# Patient Record
Sex: FEMALE | Race: BLACK OR AFRICAN AMERICAN | Employment: FULL TIME | URBAN - METROPOLITAN AREA
[De-identification: names, ages, dates, MRNs, and addresses within clinical notes are randomized per-mention and may not be internally consistent; named-entity substitution may affect disease eponyms.]

---

## 2024-01-02 NOTE — H&P (VIEW-ONLY)
Name: Danica Grove  YOB: 1985  Gender: female  MRN: [id number]    CC: [facility] (L)     HPI: Danica Grove is a 38 y.o. female who presents with shoulder pain (L). She is here today for a pre-op appointment.     No current outpatient medications on file.  No Known Allergies  No past medical history on file.  No past surgical history on file.  No family history on file.  Social History     Socioeconomic History    Marital status:      Spouse name: Not on file    Number of children: Not on file    Years of education: Not on file    Highest education level: Not on file   Occupational History    Not on file   Tobacco Use    Smoking status: Never    Smokeless tobacco: Never   Vaping Use    Vaping Use: Never used   Substance and Sexual Activity    Alcohol use: Not on file    Drug use: Not on file    Sexual activity: Not on file   Other Topics Concern    Not on file   Social History Narrative    Not on file     Social Determinants of Health     Financial Resource Strain: Not on file   Food Insecurity: Not on file   Transportation Needs: Not on file   Physical Activity: Not on file   Stress: Not on file   Social Connections: Not on file   Intimate Partner Violence: Not on file   Housing Stability: Not on file        Tobacco:  reports that she has never smoked. She has never used smokeless tobacco.  Diabetes: none  Other: none    Physical Examination:  General: no acute distress  Lungs: breathing easily  CV: regular rhythm by pulse  HEENT: Head is normocephalic and atraumatic without tenderness, visible or palpable masses, depressions or scarring.  Visual acuity intact.  Nasal mucosa is pink and moist. Pinna, tragus and ear canal are nontender and without swelling  ABD: soft and nontender to palpation. Normal bowel sound  Left Shoulder:  No previous surgical scars noted.  No gross deformity noted.  No bruising, rashes, wounds or sores noted.   Tenderness along the bicipital

## 2024-01-11 ENCOUNTER — ANESTHESIA EVENT (OUTPATIENT)
Dept: SURGERY | Age: 39
End: 2024-01-11
Payer: OTHER GOVERNMENT

## 2024-01-11 NOTE — PERIOP NOTE
Preop department called to notify patient of arrival time for scheduled procedure. Instructions given to   - Arrive at [facility] Entrance [address].  - Remain NPO after midnight, unless otherwise indicated, including gum, mints, and ice chips.   - Have a responsible adult to drive patient to the hospital, stay during surgery, and patient will need supervision 24 hours after anesthesia.   - Use antibacterial soap in shower the night before surgery and on the morning of surgery.       Was patient contacted: yes  Voicemail left: n/a  Numbers contacted: [phone]   Arrival time: 0815

## 2024-01-12 ENCOUNTER — HOSPITAL ENCOUNTER (OUTPATIENT)
Age: 39
Setting detail: OUTPATIENT SURGERY
Discharge: HOME | End: 2024-01-12
Attending: ORTHOPAEDIC SURGERY | Admitting: ORTHOPAEDIC SURGERY
Payer: OTHER GOVERNMENT

## 2024-01-12 ENCOUNTER — CLINICAL DOCUMENTATION (OUTPATIENT)
Dept: ORTHOPEDIC SURGERY | Age: 39
End: 2024-01-12

## 2024-01-12 ENCOUNTER — ANESTHESIA (OUTPATIENT)
Dept: SURGERY | Age: 39
End: 2024-01-12
Payer: OTHER GOVERNMENT

## 2024-01-12 VITALS
OXYGEN SATURATION: 96 % | WEIGHT: 185 LBS | HEART RATE: 57 BPM | RESPIRATION RATE: 18 BRPM | SYSTOLIC BLOOD PRESSURE: 109 MMHG | BODY MASS INDEX: 25.06 KG/M2 | TEMPERATURE: 97.2 F | HEIGHT: 72 IN | DIASTOLIC BLOOD PRESSURE: 63 MMHG

## 2024-01-12 DIAGNOSIS — M75.112 NONTRAUMATIC INCOMPLETE RUPTURE OF ROTATOR CUFF, LEFT: Primary | ICD-10-CM

## 2024-01-12 LAB — HCG UR QL: NEGATIVE

## 2024-01-12 PROCEDURE — 7100000001 HC PACU RECOVERY - ADDTL 15 MIN: Performed by: ORTHOPAEDIC SURGERY

## 2024-01-12 PROCEDURE — 3700000000 HC ANESTHESIA ATTENDED CARE: Performed by: ORTHOPAEDIC SURGERY

## 2024-01-12 PROCEDURE — 6360000002 HC RX W HCPCS: Performed by: ORTHOPAEDIC SURGERY

## 2024-01-12 PROCEDURE — 7100000000 HC PACU RECOVERY - FIRST 15 MIN: Performed by: ORTHOPAEDIC SURGERY

## 2024-01-12 PROCEDURE — 2500000003 HC RX 250 WO HCPCS: Performed by: STUDENT IN AN ORGANIZED HEALTH CARE EDUCATION/TRAINING PROGRAM

## 2024-01-12 PROCEDURE — 3600000004 HC SURGERY LEVEL 4 BASE: Performed by: ORTHOPAEDIC SURGERY

## 2024-01-12 PROCEDURE — 2709999900 HC NON-CHARGEABLE SUPPLY: Performed by: ORTHOPAEDIC SURGERY

## 2024-01-12 PROCEDURE — 7100000011 HC PHASE II RECOVERY - ADDTL 15 MIN: Performed by: ORTHOPAEDIC SURGERY

## 2024-01-12 PROCEDURE — 2580000003 HC RX 258: Performed by: STUDENT IN AN ORGANIZED HEALTH CARE EDUCATION/TRAINING PROGRAM

## 2024-01-12 PROCEDURE — 6360000002 HC RX W HCPCS: Performed by: STUDENT IN AN ORGANIZED HEALTH CARE EDUCATION/TRAINING PROGRAM

## 2024-01-12 PROCEDURE — 7100000010 HC PHASE II RECOVERY - FIRST 15 MIN: Performed by: ORTHOPAEDIC SURGERY

## 2024-01-12 PROCEDURE — 2500000003 HC RX 250 WO HCPCS: Performed by: NURSE ANESTHETIST, CERTIFIED REGISTERED

## 2024-01-12 PROCEDURE — 81025 URINE PREGNANCY TEST: CPT

## 2024-01-12 PROCEDURE — 2580000003 HC RX 258: Performed by: NURSE ANESTHETIST, CERTIFIED REGISTERED

## 2024-01-12 PROCEDURE — C1763 CONN TISS, NON-HUMAN: HCPCS | Performed by: ORTHOPAEDIC SURGERY

## 2024-01-12 PROCEDURE — 2720000010 HC SURG SUPPLY STERILE: Performed by: ORTHOPAEDIC SURGERY

## 2024-01-12 PROCEDURE — 6360000002 HC RX W HCPCS: Performed by: NURSE ANESTHETIST, CERTIFIED REGISTERED

## 2024-01-12 PROCEDURE — 64415 NJX AA&/STRD BRCH PLXS IMG: CPT | Performed by: STUDENT IN AN ORGANIZED HEALTH CARE EDUCATION/TRAINING PROGRAM

## 2024-01-12 PROCEDURE — C1713 ANCHOR/SCREW BN/BN,TIS/BN: HCPCS | Performed by: ORTHOPAEDIC SURGERY

## 2024-01-12 PROCEDURE — 3600000014 HC SURGERY LEVEL 4 ADDTL 15MIN: Performed by: ORTHOPAEDIC SURGERY

## 2024-01-12 PROCEDURE — 6360000002 HC RX W HCPCS: Performed by: PHYSICIAN ASSISTANT

## 2024-01-12 PROCEDURE — 3700000001 HC ADD 15 MINUTES (ANESTHESIA): Performed by: ORTHOPAEDIC SURGERY

## 2024-01-12 DEVICE — IMPLANTABLE DEVICE
Type: IMPLANTABLE DEVICE | Site: SHOULDER | Status: FUNCTIONAL
Brand: BIOINDUCTIVE IMPLANT WITH ARTHROSCOPIC DELIVERY SYSTEM - MEDIUM

## 2024-01-12 DEVICE — ANCHOR TEND 8 FOR REGENETEN BIOINDUCTIVE IMPL SYS: Type: IMPLANTABLE DEVICE | Site: SHOULDER | Status: FUNCTIONAL

## 2024-01-12 DEVICE — IMPLANT SYSTEM, FIBERTAK BUTTON
Type: IMPLANTABLE DEVICE | Site: SHOULDER | Status: FUNCTIONAL
Brand: ARTHREX®

## 2024-01-12 DEVICE — BONE ANCHORS 3 WITH ARTHROSCOPIC DELIVERY SYSTEM ADVANCED
Type: IMPLANTABLE DEVICE | Site: SHOULDER | Status: FUNCTIONAL
Brand: BONE ANCHORS WITH ARTHROSCOPIC DELIVERY SYSTEM - ADVANCED

## 2024-01-12 RX ORDER — SODIUM CHLORIDE 9 MG/ML
INJECTION, SOLUTION INTRAVENOUS PRN
Status: DISCONTINUED | OUTPATIENT
Start: 2024-01-12 | End: 2024-01-12 | Stop reason: HOSPADM

## 2024-01-12 RX ORDER — MIDAZOLAM HYDROCHLORIDE 2 MG/2ML
2 INJECTION, SOLUTION INTRAMUSCULAR; INTRAVENOUS
Status: COMPLETED | OUTPATIENT
Start: 2024-01-12 | End: 2024-01-12

## 2024-01-12 RX ORDER — HYDROMORPHONE HYDROCHLORIDE 2 MG/ML
0.5 INJECTION, SOLUTION INTRAMUSCULAR; INTRAVENOUS; SUBCUTANEOUS EVERY 5 MIN PRN
Status: DISCONTINUED | OUTPATIENT
Start: 2024-01-12 | End: 2024-01-12 | Stop reason: HOSPADM

## 2024-01-12 RX ORDER — PROPOFOL 10 MG/ML
INJECTION, EMULSION INTRAVENOUS PRN
Status: DISCONTINUED | OUTPATIENT
Start: 2024-01-12 | End: 2024-01-12 | Stop reason: SDUPTHER

## 2024-01-12 RX ORDER — DEXAMETHASONE SODIUM PHOSPHATE 4 MG/ML
INJECTION, SOLUTION INTRA-ARTICULAR; INTRALESIONAL; INTRAMUSCULAR; INTRAVENOUS; SOFT TISSUE PRN
Status: DISCONTINUED | OUTPATIENT
Start: 2024-01-12 | End: 2024-01-12 | Stop reason: SDUPTHER

## 2024-01-12 RX ORDER — SODIUM CHLORIDE 0.9 % (FLUSH) 0.9 %
5-40 SYRINGE (ML) INJECTION EVERY 12 HOURS SCHEDULED
Status: DISCONTINUED | OUTPATIENT
Start: 2024-01-12 | End: 2024-01-12 | Stop reason: HOSPADM

## 2024-01-12 RX ORDER — PROCHLORPERAZINE EDISYLATE 5 MG/ML
5 INJECTION INTRAMUSCULAR; INTRAVENOUS
Status: COMPLETED | OUTPATIENT
Start: 2024-01-12 | End: 2024-01-12

## 2024-01-12 RX ORDER — LIDOCAINE HYDROCHLORIDE 20 MG/ML
INJECTION, SOLUTION EPIDURAL; INFILTRATION; INTRACAUDAL; PERINEURAL PRN
Status: DISCONTINUED | OUTPATIENT
Start: 2024-01-12 | End: 2024-01-12 | Stop reason: SDUPTHER

## 2024-01-12 RX ORDER — OXYCODONE HYDROCHLORIDE 5 MG/1
5 TABLET ORAL EVERY 6 HOURS PRN
Qty: 30 TABLET | Refills: 0 | Status: SHIPPED | OUTPATIENT
Start: 2024-01-12 | End: 2024-01-19

## 2024-01-12 RX ORDER — ONDANSETRON 2 MG/ML
INJECTION INTRAMUSCULAR; INTRAVENOUS PRN
Status: DISCONTINUED | OUTPATIENT
Start: 2024-01-12 | End: 2024-01-12 | Stop reason: SDUPTHER

## 2024-01-12 RX ORDER — TRANEXAMIC ACID 100 MG/ML
INJECTION, SOLUTION INTRAVENOUS PRN
Status: DISCONTINUED | OUTPATIENT
Start: 2024-01-12 | End: 2024-01-12 | Stop reason: SDUPTHER

## 2024-01-12 RX ORDER — GLYCOPYRROLATE 0.2 MG/ML
INJECTION INTRAMUSCULAR; INTRAVENOUS PRN
Status: DISCONTINUED | OUTPATIENT
Start: 2024-01-12 | End: 2024-01-12 | Stop reason: SDUPTHER

## 2024-01-12 RX ORDER — NEOSTIGMINE METHYLSULFATE 1 MG/ML
INJECTION, SOLUTION INTRAVENOUS PRN
Status: DISCONTINUED | OUTPATIENT
Start: 2024-01-12 | End: 2024-01-12 | Stop reason: SDUPTHER

## 2024-01-12 RX ORDER — LIDOCAINE HYDROCHLORIDE 10 MG/ML
1 INJECTION, SOLUTION INFILTRATION; PERINEURAL
Status: DISCONTINUED | OUTPATIENT
Start: 2024-01-12 | End: 2024-01-12 | Stop reason: HOSPADM

## 2024-01-12 RX ORDER — BUPIVACAINE HYDROCHLORIDE AND EPINEPHRINE 5; 5 MG/ML; UG/ML
INJECTION, SOLUTION EPIDURAL; INTRACAUDAL; PERINEURAL
Status: COMPLETED | OUTPATIENT
Start: 2024-01-12 | End: 2024-01-12

## 2024-01-12 RX ORDER — SODIUM CHLORIDE 0.9 % (FLUSH) 0.9 %
5-40 SYRINGE (ML) INJECTION PRN
Status: DISCONTINUED | OUTPATIENT
Start: 2024-01-12 | End: 2024-01-12 | Stop reason: HOSPADM

## 2024-01-12 RX ORDER — SODIUM CHLORIDE, SODIUM LACTATE, POTASSIUM CHLORIDE, CALCIUM CHLORIDE 600; 310; 30; 20 MG/100ML; MG/100ML; MG/100ML; MG/100ML
INJECTION, SOLUTION INTRAVENOUS CONTINUOUS
Status: DISCONTINUED | OUTPATIENT
Start: 2024-01-12 | End: 2024-01-12 | Stop reason: HOSPADM

## 2024-01-12 RX ORDER — ONDANSETRON 2 MG/ML
4 INJECTION INTRAMUSCULAR; INTRAVENOUS
Status: DISCONTINUED | OUTPATIENT
Start: 2024-01-12 | End: 2024-01-12 | Stop reason: HOSPADM

## 2024-01-12 RX ORDER — FENTANYL CITRATE 50 UG/ML
100 INJECTION, SOLUTION INTRAMUSCULAR; INTRAVENOUS
Status: COMPLETED | OUTPATIENT
Start: 2024-01-12 | End: 2024-01-12

## 2024-01-12 RX ORDER — OXYCODONE HYDROCHLORIDE 5 MG/1
5 TABLET ORAL
Status: DISCONTINUED | OUTPATIENT
Start: 2024-01-12 | End: 2024-01-12 | Stop reason: HOSPADM

## 2024-01-12 RX ORDER — ROCURONIUM BROMIDE 10 MG/ML
INJECTION, SOLUTION INTRAVENOUS PRN
Status: DISCONTINUED | OUTPATIENT
Start: 2024-01-12 | End: 2024-01-12 | Stop reason: SDUPTHER

## 2024-01-12 RX ORDER — DEXAMETHASONE SODIUM PHOSPHATE 4 MG/ML
INJECTION, SOLUTION INTRA-ARTICULAR; INTRALESIONAL; INTRAMUSCULAR; INTRAVENOUS; SOFT TISSUE
Status: COMPLETED | OUTPATIENT
Start: 2024-01-12 | End: 2024-01-12

## 2024-01-12 RX ORDER — EPINEPHRINE 1 MG/ML(1)
AMPUL (ML) INJECTION PRN
Status: DISCONTINUED | OUTPATIENT
Start: 2024-01-12 | End: 2024-01-12 | Stop reason: ALTCHOICE

## 2024-01-12 RX ORDER — EPHEDRINE SULFATE/0.9% NACL/PF 50 MG/5 ML
SYRINGE (ML) INTRAVENOUS PRN
Status: DISCONTINUED | OUTPATIENT
Start: 2024-01-12 | End: 2024-01-12 | Stop reason: SDUPTHER

## 2024-01-12 RX ORDER — SODIUM CHLORIDE 9 MG/ML
INJECTION, SOLUTION INTRAVENOUS CONTINUOUS
Status: DISCONTINUED | OUTPATIENT
Start: 2024-01-12 | End: 2024-01-12 | Stop reason: HOSPADM

## 2024-01-12 RX ADMIN — MIDAZOLAM 2 MG: 1 INJECTION INTRAMUSCULAR; INTRAVENOUS at 10:18

## 2024-01-12 RX ADMIN — TRANEXAMIC ACID 1000 MG: 100 INJECTION, SOLUTION INTRAVENOUS at 11:39

## 2024-01-12 RX ADMIN — BUPIVACAINE HYDROCHLORIDE AND EPINEPHRINE BITARTRATE 15 ML: 5; .005 INJECTION, SOLUTION EPIDURAL; INTRACAUDAL; PERINEURAL at 10:18

## 2024-01-12 RX ADMIN — PHENYLEPHRINE HYDROCHLORIDE 100 MCG: 10 INJECTION INTRAVENOUS at 11:51

## 2024-01-12 RX ADMIN — PROPOFOL 200 MG: 10 INJECTION, EMULSION INTRAVENOUS at 11:29

## 2024-01-12 RX ADMIN — Medication 2 G: at 11:39

## 2024-01-12 RX ADMIN — DEXAMETHASONE SODIUM PHOSPHATE 4 MG: 4 INJECTION, SOLUTION INTRAMUSCULAR; INTRAVENOUS at 10:18

## 2024-01-12 RX ADMIN — LIDOCAINE HYDROCHLORIDE 80 MG: 20 INJECTION, SOLUTION EPIDURAL; INFILTRATION; INTRACAUDAL; PERINEURAL at 11:29

## 2024-01-12 RX ADMIN — FENTANYL CITRATE 100 MCG: 50 INJECTION INTRAMUSCULAR; INTRAVENOUS at 11:29

## 2024-01-12 RX ADMIN — ONDANSETRON 4 MG: 2 INJECTION INTRAMUSCULAR; INTRAVENOUS at 11:39

## 2024-01-12 RX ADMIN — ROCURONIUM BROMIDE 50 MG: 50 INJECTION, SOLUTION INTRAVENOUS at 11:29

## 2024-01-12 RX ADMIN — PHENYLEPHRINE HYDROCHLORIDE 100 MCG: 10 INJECTION INTRAVENOUS at 12:00

## 2024-01-12 RX ADMIN — GLYCOPYRROLATE 0.4 MG: 0.2 INJECTION INTRAMUSCULAR; INTRAVENOUS at 12:46

## 2024-01-12 RX ADMIN — DEXAMETHASONE SODIUM PHOSPHATE 4 MG: 4 INJECTION, SOLUTION INTRAMUSCULAR; INTRAVENOUS at 11:39

## 2024-01-12 RX ADMIN — SODIUM CHLORIDE, POTASSIUM CHLORIDE, SODIUM LACTATE AND CALCIUM CHLORIDE: 600; 310; 30; 20 INJECTION, SOLUTION INTRAVENOUS at 09:08

## 2024-01-12 RX ADMIN — Medication 10 MG: at 12:03

## 2024-01-12 RX ADMIN — Medication 3 MG: at 12:46

## 2024-01-12 RX ADMIN — PROCHLORPERAZINE EDISYLATE 5 MG: 5 INJECTION INTRAMUSCULAR; INTRAVENOUS at 14:00

## 2024-01-12 RX ADMIN — PHENYLEPHRINE HYDROCHLORIDE 100 MCG: 10 INJECTION INTRAVENOUS at 11:43

## 2024-01-12 RX ADMIN — FENTANYL CITRATE 50 MCG: 50 INJECTION, SOLUTION INTRAMUSCULAR; INTRAVENOUS at 10:18

## 2024-01-12 ASSESSMENT — PAIN SCALES - GENERAL: PAINLEVEL_OUTOF10: 0

## 2024-01-12 NOTE — ANESTHESIA POSTPROCEDURE EVALUATION
Department of Anesthesiology  Postprocedure Note    Patient: Danica Badillo  MRN: [id number]  YOB: 1985  Date of evaluation: 1/12/2024    Procedure Summary     Date: 01/12/24 Room / Location: [facility] OP OR 06 / [facility]    Anesthesia Start: 1119 Anesthesia Stop: 1335    Procedures:       left shoulder arthroscopy with subacromial decompression and  rotator cuff repair with allograft augmentation (Left: Shoulder)      LEFT SHOULDER OPEN BICEPS TENODESIS (Left: Shoulder) Diagnosis:       Superior glenoid labrum lesion of left shoulder, initial encounter      Impingement syndrome of left shoulder      Nontraumatic incomplete rupture of rotator cuff, left      (Superior glenoid labrum lesion of left shoulder, initial encounter [S43.432A])      (Impingement syndrome of left shoulder [M75.42])      (Nontraumatic incomplete rupture of rotator cuff, left [M75.112])    Surgeons: Jani Abrams MD Responsible Provider: Patrick Boyce MD    Anesthesia Type: General ASA Status: 2          Anesthesia Type: General    Germaine Phase I: Germaine Score: 7    Germaine Phase II: Germaine Score: 9    Anesthesia Post Evaluation    Patient location during evaluation: PACU  Patient participation: complete - patient participated  Post-procedure mental status: Sleepy but responds to questions.  Airway patency: patent  Nausea & Vomiting: nausea and no vomiting  Cardiovascular status: acceptable.  Respiratory status: acceptable  Hydration status: stable  Pain management: adequate        No notable events documented.

## 2024-01-12 NOTE — DISCHARGE INSTRUCTIONS
Post-op instructions for Shoulder Rotator Cuff Repair / Labral Repair / Stabilization   Day of Surgery:     DIET:   Begin with liquids and light foods (jell-o, soup, etc.). Progress to your normal diet if you are not nauseated.    MEDICATION:   1. Pain- Norco (hydrocodone/acetaminophen) or Oxycodone. If you are taking oxycodone, you may also take two (2) Tylenol (acetaminophen) 500mg tabs every eight (8) hours. Do not take Tylenol (acetaminophen) if you are given Norco as this medicine already contains acetaminophen. Do not drink alcohol while taking pain medication. Slowly wean off the pain medication as the pain improves.   2. Stool Softener-Pain medication can cause constipation. Be sure to drink plenty of water and take an over the counter stool softener as needed.     ICE:  Do NOT put the ice machine directly on your skin. Use it frequently for the first 72 hours. You may also use a regular ice bag/pack for 20 minutes at a time. Place a thin layer of clothing or pillow case between ice pack and your skin. Ice for 20-30 minutes on and then 20-30 minutes off.        Did  you  receive  a  nerve  Block?  A  nerve  block  can  provide  pain  relief  for  one  hour  to  two  days  after  your  surgery.  As  long  as  the  nerve  block  is  working,  you  will  experience  little  or  no  sensation  in  the  area  the  surgeon  operated  on.        As  the  nerve  block  wears  off,  you  will  begin  to  experience  pain  or  discomfort.  It  is  very  important  that  you  begin  taking  your  prescribed  pain  medication  before  the  nerve  block  fully  wears  off. The first sign that the nerve block is wearing off is tingling in your fingers. Treating  your  pain  at  the  first  sign  of  the  block  wearing  off  will  ensure  your  pain  is  better  controlled  and  more  tolerable  when  full-sensation  returns.  Do  not  wait  until  the  pain  is  intolerable,  as  the  medicine  will  be  less  effective.

## 2024-01-12 NOTE — OP NOTE
Operative Report    Patient: Danica Badillo MRN: [id number]  SSN: [id number]    YOB: 1985  Age: 38 y.o.  Sex: female       Date of Surgery: 1/12/2024     Preoperative Diagnosis:   1)left  Shoulder Rotator cuff tear  2)left  Shoulder outlet impingement  3) left  Shoulder biceps tendinitis    Postoperative Diagnosis: Same as above     Surgeon(s) and Role:     * Jani Abrams MD - Primary    Assistant: Coty Santoyo PA-C   The complexity of the surgery requires the use of a first assistant for patient positioning, suture management, anchor placement, and tensioning of repair and assistance in closure.  DAPHNE Eduardo was this assistant and was there for the entirety of the case.     Anesthesia: General + ISB block    Procedure: 1) left  Shoulder Arthroscopic Rotator Cuff Repair with Regeneten patch  2)  left shoulder open subpectoral biceps tenodesis  3) left  Shoulder Arthroscopy with subacromial decompression  4) left shoulder arthroscopy with debridement including labrum and biceps tendon      Estimated Blood Loss:  20 mL      Implants: Arthrex proximal biceps button  Regeneten patch with tendon staples and 1 bone staple           Specimens: * No specimens in log *        Drains: None                Complications: None    Counts: Sponge and needle counts were correct times two.    Procedure in Detail: After informed consent was obtained the surgical site was marked in the preoperative area by myself and the patient was brought to the operating room and general anesthesia was induced.  The patient was positioned in the beachchair position with all bony prominences well-padded and the operative shoulder was prepped and draped in the usual orthopedic sterile fashion.  Timeout was performed per protocol antibiotics given per protocol.     Initially a standard posterior lateral arthroscopy viewing portal was established.  Diagnostic arthroscopy was carried out.  Anterior  button in this unicortically.  The tendon was then tensioned down in a tension slide technique for an onlay tenodesis technique.  We took a free needle and passed back through the tendon with a fiber loop suture and tied this down as a knot.  I was pleased with the quality of fixation and the tensioning.  This wound was irrigated copiously and then closed in layers with buried 2-0 Monocryl suture. The skin closed with running 3-0 subcuticular Monocryl and Dermabond.     Shoulder was then drained portal sites were closed with buried Monocryl suture and Steri-Strips sterile dressing cryotherapy device and sling and abduction pillow were applied.  Danica tolerated the procedure well was awakened and transferred to the PACU in stable condition    Discharge plan:  Disposition: Home  Rehab protocol: Small rotator cuff repair and biceps tenodesis protocol.  Sling immobilization 4 weeks        Signed By:  Jani Abrams MD     January 12, 2024

## 2024-01-12 NOTE — ANESTHESIA PRE PROCEDURE
AGRATIO 1.1 07/30/2021 09:29 PM    GLUCOSE 91 07/30/2021 09:29 PM    PROT 7.3 07/30/2021 09:29 PM    CALCIUM 8.6 07/30/2021 09:29 PM    BILITOT 0.4 07/30/2021 09:29 PM    ALKPHOS 38 07/30/2021 09:29 PM    AST 10 07/30/2021 09:29 PM    ALT 17 07/30/2021 09:29 PM       POC Tests: No results for input(s): \"POCGLU\", \"POCNA\", \"POCK\", \"POCCL\", \"POCBUN\", \"POCHEMO\", \"POCHCT\" in the last 72 hours.    Coags: No results found for: \"PROTIME\", \"INR\", \"APTT\"    HCG (If Applicable):   Lab Results   Component Value Date    PREGTESTUR Negative 01/12/2024        ABGs: No results found for: \"PHART\", \"PO2ART\", \"KAZ0SFK\", \"MUJ5HIH\", \"BEART\", \"M1GVFYNF\"     Type & Screen (If Applicable):  No results found for: \"LABABO\", \"LABRH\"    Drug/Infectious Status (If Applicable):  No results found for: \"HIV\", \"HEPCAB\"    COVID-19 Screening (If Applicable):   Lab Results   Component Value Date/Time    COVID19 Detected 07/30/2021 09:29 PM           Anesthesia Evaluation  Patient summary reviewed and Nursing notes reviewed   no history of anesthetic complications:   Airway: Mallampati: II  TM distance: >3 FB   Neck ROM: full  Mouth opening: > = 3 FB   Dental: normal exam         Pulmonary:normal exam  breath sounds clear to auscultation                             Cardiovascular:  Exercise tolerance: good (>4 METS)          Rhythm: regular  Rate: normal                    Neuro/Psych:   Negative Neuro/Psych ROS  (+) depression/anxiety             GI/Hepatic/Renal: Neg GI/Hepatic/Renal ROS            Endo/Other: Negative Endo/Other ROS   (+) hypothyroidism::..                 Abdominal:             Vascular:          Other Findings:             Anesthesia Plan      general     ASA 2       Induction: intravenous.    MIPS: Postoperative opioids intended.  Anesthetic plan and risks discussed with patient.      Plan discussed with CRNA.          Post-op pain plan if not by surgeon: single peripheral nerve block            aPtrick Boyce MD

## 2024-01-12 NOTE — ANESTHESIA PROCEDURE NOTES
Peripheral Block    Patient location during procedure: pre-op  Reason for block: post-op pain management and at surgeon's request  Start time: 1/12/2024 10:18 AM  End time: 1/12/2024 10:25 AM  Staffing  Performed: anesthesiologist   Anesthesiologist: Patrick Boyce MD  Performed by: Patrick Boyce MD  Authorized by: Patrick Boyce MD    Preanesthetic Checklist  Completed: patient identified, site marked, risks and benefits discussed, equipment checked, pre-op evaluation, timeout performed, anesthesia consent given, oxygen available and monitors applied/VS acknowledged  Peripheral Block   Patient position: sitting  Prep: ChloraPrep  Provider prep: mask and sterile gloves  Patient monitoring: cardiac monitor, continuous pulse ox, oxygen, IV access, frequent blood pressure checks and responsive to questions  Block type: Brachial plexus  Interscalene  Laterality: left  Injection technique: single-shot  Guidance: ultrasound guided  Local infiltration: lidocaine  Infiltration strength: 1 %  Local infiltration: lidocaine  Dose: 3 mL    Needle   Needle type: insulated echogenic nerve stimulator needle   Needle gauge: 20 G  Needle localization: ultrasound guidance (minimal motor response at >0.4 mA)  Needle length: 10 cm  Assessment   Injection assessment: negative aspiration for heme, no paresthesia on injection, local visualized surrounding nerve on ultrasound and no intravascular symptoms  Paresthesia pain: none  Slow fractionated injection: yes  Hemodynamics: stable  Real-time US image taken/store: yes  Outcomes: patient tolerated procedure well and uncomplicated    Additional Notes  Risks/benefits/alternatives discussed including damage to nerve or muscle.  3 cc 1% lidocaine local injected at needle insertion site.  Needle inserted and placed in close proximity to the nerve under real time ultrasound guidance.  Ultrasound was used to visualize the spread of local anesthetic in close proximity to the nerve being

## 2024-01-12 NOTE — INTERVAL H&P NOTE
Update History & Physical    The Patient's History and Physical was reviewed   I discussed the surgery and patients medical condition with the patient.  The chart was reviewed with the patient and I examined the patient.    There was no change from previous note.  The surgical site was confirmed by the patient and me.    CV: RRR  RESP: CTAB    Plan:  The risk, benefits, expected outcome, and alternative to the recommended procedure have been discussed with the patient.  Patient understands and elects to proceed with the procedure as planned.    Electronically signed by Jani Abrams MD on 01/12/24 9:58 AM

## 2024-01-15 DIAGNOSIS — M75.112 NONTRAUMATIC INCOMPLETE TEAR OF LEFT ROTATOR CUFF: Primary | ICD-10-CM

## 2024-01-16 ENCOUNTER — HOSPITAL ENCOUNTER (OUTPATIENT)
Dept: PHYSICAL THERAPY | Age: 39
Setting detail: RECURRING SERIES
Discharge: HOME | End: 2024-01-19
Payer: OTHER GOVERNMENT

## 2024-01-16 DIAGNOSIS — Z98.890 S/P LEFT ROTATOR CUFF REPAIR: ICD-10-CM

## 2024-01-16 DIAGNOSIS — M25.512 LEFT SHOULDER PAIN, UNSPECIFIED CHRONICITY: Primary | ICD-10-CM

## 2024-01-16 DIAGNOSIS — R29.898 SHOULDER WEAKNESS: ICD-10-CM

## 2024-01-16 PROCEDURE — 97110 THERAPEUTIC EXERCISES: CPT

## 2024-01-16 PROCEDURE — 97161 PT EVAL LOW COMPLEX 20 MIN: CPT

## 2024-01-16 NOTE — PROGRESS NOTES
Danica Badillo  : 1985  Primary: Vaccn [employer] (Other)  Secondary:  [facility] [place]  [address]  [facility] [address]  Phone: [phone]  Fax: [phone] Plan Frequency: 1-2x/wk  Plan of Care/Certification Expiration Date: 04/15/24        Plan of Care/Certification Expiration Date:  Plan of Care/Certification Expiration Date: 04/15/24    Frequency/Duration: Plan Frequency: 1-2x/wk      Time In/Out:   Time In: 1635  Time Out: 1735      PT Visit Info:    Plan Frequency: 1-2x/wk      Visit Count:  1    OUTPATIENT PHYSICAL THERAPY:   Treatment Note 2024       Episode  (s/p L shoulder scope)               Treatment Diagnosis:    Left shoulder pain, unspecified chronicity  Shoulder weakness  S/P left rotator cuff repair  Medical/Referring Diagnosis:    Nontraumatic incomplete tear of left rotator cuff [M75.112]    Referring Physician:  Jani Abrams MD MD Orders:  PT Eval and Treat   Return MD Appt:  24   Date of Onset:  Onset Date: 24 (DOS)     Allergies:   Patient has no known allergies.  Restrictions/Precautions:   Small rotator cuff repair and biceps tenodesis protocol      Interventions Planned (Treatment may consist of any combination of the following):  Current Treatment Recommendations: Strengthening; ROM; Manual; Safety education & training; Dry needling; Modalities; Home exercise program; Return to work related activity; Pain management    See Assessment Note    Subjective Comments:   Reports that she has had moderate pain since surgery. Has had a hard time sleeping.   Initial Pain Level::     7/10  Post Session Pain Level:       5/10  Medications Last Reviewed:  2024  Updated Objective Findings:  See Evaluation Note from today  Treatment   THERAPEUTIC EXERCISE: (15 minutes):    Exercises per grid below to improve mobility and strength.     Date:  24   Activity/Exercise Parameters   Education Discussed HEP,

## 2024-01-16 NOTE — PLAN OF CARE
Danica Badillo  : 1985  Primary: Vaccn [employer] (Other)  Secondary:  [facility] [place]  [address]  [facility] [address]  Phone: [phone]  Fax: [phone] Plan Frequency: 1-2x/wk  Plan of Care/Certification Expiration Date: 04/15/24        Plan of Care/Certification Expiration Date:  Plan of Care/Certification Expiration Date: 04/15/24    Frequency/Duration: Plan Frequency: 1-2x/wk      Time In/Out:   Time In: 1635  Time Out: 1735      PT Visit Info:    Plan Frequency: 1-2x/wk      Visit Count:  1                OUTPATIENT PHYSICAL THERAPY:             Initial Assessment 2024               Episode (s/p L shoulder scope)         Treatment Diagnosis:    Left shoulder pain, unspecified chronicity  Shoulder weakness  S/P left rotator cuff repair  Medical/Referring Diagnosis:    Nontraumatic incomplete tear of left rotator cuff [M75.112]    Referring Physician:  Jani Abrams MD MD Orders:  PT Eval and Treat   Return MD Appt:  24  Date of Onset:  Onset Date: 24 (DOS)     Allergies:  Patient has no known allergies.  Restrictions/Precautions:    Follow small rotator cuff and biceps tenodesis protocol      Medications Last Reviewed:  2024     SUBJECTIVE   History of Injury/Illness (Reason for Referral):  Reports that she had shoulder trouble for a long time. She went to physical therapy for about a year in . She had an mri and decided to have surgery on 24. She has had a hard time sleeping since then. She has been taking oxycodone 5mg for pain. She is right handed.  She enjoys going to the gym and running long distance. She is still active duty in the .   Patient Stated Goal(s):  \"to return to full activity\"  Initial Pain Level:      7/10   Post Session Pain Level:     5/10  Past Medical History/Comorbidities:   Ms. Perfecto Badillo  has a past medical history of Hypothyroidism and PTSD (post-traumatic

## 2024-01-21 ASSESSMENT — PAIN SCALES - GENERAL: PAINLEVEL_OUTOF10: 7

## 2024-01-22 ENCOUNTER — TELEPHONE (OUTPATIENT)
Dept: ORTHOPEDIC SURGERY | Age: 39
End: 2024-01-22

## 2024-01-22 NOTE — TELEPHONE ENCOUNTER
Spoke with patient and discussed biceps incision. States she does not feel sick. Incision is closed, she denies redness and pain. Explained there is glue over the top of the incision, which may cause it to look abnormal. Told her as long as she does not feel ill, she can wait till her follow up appointment with Jon AVIAL tomorrow to assess the incision

## 2024-01-22 NOTE — PROGRESS NOTES
Name: Danica Badillo  YOB: 1985  Gender: female  MRN: [id number]    Procedure Performed:   1) left  Shoulder Arthroscopic Rotator Cuff Repair with Regeneten patch  2) left shoulder open subpectoral biceps tenodesis  3) left  Shoulder Arthroscopy with subacromial decompression  4) left shoulder arthroscopy with debridement including labrum and biceps tendon    Date of Procedure: 1/12/24    Subjective: Returns 2 weeks status post the above procedure. She is not having much pain at this time. Her only complaint is having trouble sleeping at night. She is working with Raul at [facility].     Physical Examination: All portal Incisions clean dry and intact, no sign of infection. He bicep tendonesis incision has some drainage. Motor and sensory intact. Distal radial pulse 2+.     Assessment:   1. Status post orthopedic surgery, follow-up exam       Plan:   Doing well.   Continue PT per Small rotator cuff repair and biceps tenodesis protocol.  Sling immobilization 4 weeks  Follow up in 2 weeks for wound check with me.

## 2024-01-22 NOTE — TELEPHONE ENCOUNTER
Pt called she has some questions about her scars she states that it dosent look good looks infected

## 2024-01-23 ENCOUNTER — HOSPITAL ENCOUNTER (OUTPATIENT)
Dept: PHYSICAL THERAPY | Age: 39
Setting detail: RECURRING SERIES
Discharge: HOME | End: 2024-01-26
Payer: OTHER GOVERNMENT

## 2024-01-23 PROCEDURE — 97110 THERAPEUTIC EXERCISES: CPT

## 2024-01-23 PROCEDURE — 97140 MANUAL THERAPY 1/> REGIONS: CPT

## 2024-01-23 ASSESSMENT — PAIN SCALES - GENERAL: PAINLEVEL_OUTOF10: 7

## 2024-01-23 NOTE — PROGRESS NOTES
Danica Badillo  : 1985  Primary: Vaccn [employer] (Other)  Secondary:  [facility] [place]  [address]  [facility] [address]  Phone: [phone]  Fax: [phone] Plan Frequency: 1-2x/wk  Plan of Care/Certification Expiration Date: 04/15/24        Plan of Care/Certification Expiration Date:  Plan of Care/Certification Expiration Date: 04/15/24    Frequency/Duration: Plan Frequency: 1-2x/wk      Time In/Out:   Time In: 1105  Time Out: 1155      PT Visit Info:    Plan Frequency: 1-2x/wk      Visit Count:  2    OUTPATIENT PHYSICAL THERAPY:   Treatment Note 2024       Episode  (s/p L shoulder scope)               Treatment Diagnosis:    No data found  Medical/Referring Diagnosis:    Nontraumatic incomplete tear of left rotator cuff [M75.112]    Referring Physician:  Jani Abrams MD MD Orders:  PT Eval and Treat   Return MD Appt:  24   Date of Onset:  Onset Date: 24 (DOS)     Allergies:   Patient has no known allergies.  Restrictions/Precautions:   Small rotator cuff repair and biceps tenodesis protocol      Interventions Planned (Treatment may consist of any combination of the following):  Current Treatment Recommendations: Strengthening; ROM; Manual; Safety education & training; Dry needling; Modalities; Home exercise program; Return to work related activity; Pain management    See Assessment Note    Subjective Comments:   Reports that she is feeling some better. Less pain with sleeping.  Initial Pain Level::     7/10  Post Session Pain Level:       1/10  Medications Last Reviewed:  2024  Updated Objective Findings:  See Evaluation Note from today  Treatment   THERAPEUTIC EXERCISE: (15 minutes):    Exercises per grid below to improve mobility and strength.     Date:  24 Date  24   Activity/Exercise Parameters    Education Discussed HEP, plan of care Reviewed HEP   Scapular retraction Scap squeezes, 10x Side-lying,

## 2024-01-25 ENCOUNTER — OFFICE VISIT (OUTPATIENT)
Dept: ORTHOPEDIC SURGERY | Age: 39
End: 2024-01-25

## 2024-01-25 DIAGNOSIS — Z09 STATUS POST ORTHOPEDIC SURGERY, FOLLOW-UP EXAM: Primary | ICD-10-CM

## 2024-01-25 PROCEDURE — 99024 POSTOP FOLLOW-UP VISIT: CPT | Performed by: PHYSICIAN ASSISTANT

## 2024-01-26 ENCOUNTER — CLINICAL DOCUMENTATION (OUTPATIENT)
Dept: ORTHOPEDIC SURGERY | Age: 39
End: 2024-01-26

## 2024-01-30 ENCOUNTER — HOSPITAL ENCOUNTER (OUTPATIENT)
Dept: PHYSICAL THERAPY | Age: 39
Setting detail: RECURRING SERIES
Discharge: HOME | End: 2024-02-02
Payer: OTHER GOVERNMENT

## 2024-01-30 PROCEDURE — 97110 THERAPEUTIC EXERCISES: CPT

## 2024-01-30 PROCEDURE — 97140 MANUAL THERAPY 1/> REGIONS: CPT

## 2024-01-30 ASSESSMENT — PAIN SCALES - GENERAL: PAINLEVEL_OUTOF10: 3

## 2024-01-30 NOTE — PROGRESS NOTES
Danica Badillo  : 1985  Primary: Vaccn [employer] (Other)  Secondary:  [facility] [place]  [address]  [facility] [address]  Phone: [phone]  Fax: [phone] Plan Frequency: 1-2x/wk  Plan of Care/Certification Expiration Date: 04/15/24        Plan of Care/Certification Expiration Date:  Plan of Care/Certification Expiration Date: 04/15/24    Frequency/Duration: Plan Frequency: 1-2x/wk      Time In/Out:   Time In: 1430  Time Out: 1528      PT Visit Info:    Plan Frequency: 1-2x/wk      Visit Count:  3    OUTPATIENT PHYSICAL THERAPY:   Treatment Note 2024       Episode  (s/p L shoulder scope)               Treatment Diagnosis:    No data found  Medical/Referring Diagnosis:    Nontraumatic incomplete tear of left rotator cuff [M75.112]    Referring Physician:  Jani Abrams MD MD Orders:  PT Eval and Treat   Return MD Appt:  24   Date of Onset:  Onset Date: 24 (DOS)     Allergies:   Patient has no known allergies.  Restrictions/Precautions:   Small rotator cuff repair and biceps tenodesis protocol      Interventions Planned (Treatment may consist of any combination of the following):  Current Treatment Recommendations: Strengthening; ROM; Manual; Safety education & training; Dry needling; Modalities; Home exercise program; Return to work related activity; Pain management    See Assessment Note    Subjective Comments:   Reports that she is doing some better. Sleeping is still difficult.   Initial Pain Level::     3/10  Post Session Pain Level:       7/10  Medications Last Reviewed:  2024  Updated Objective Findings:   biceps tendon has intact steristrips, changed the dressing-no signs of drainage  Treatment   THERAPEUTIC EXERCISE: (15 minutes):    Exercises per grid below to improve mobility and strength.     Date:  24 Date  24 Date  24   Activity/Exercise Parameters     Education Discussed HEP, plan of care

## 2024-02-05 ENCOUNTER — HOSPITAL ENCOUNTER (OUTPATIENT)
Dept: PHYSICAL THERAPY | Age: 39
Setting detail: RECURRING SERIES
Discharge: HOME | End: 2024-02-08
Payer: OTHER GOVERNMENT

## 2024-02-05 PROCEDURE — 97110 THERAPEUTIC EXERCISES: CPT

## 2024-02-05 PROCEDURE — 97140 MANUAL THERAPY 1/> REGIONS: CPT

## 2024-02-05 ASSESSMENT — PAIN SCALES - GENERAL: PAINLEVEL_OUTOF10: 5

## 2024-02-05 NOTE — PROGRESS NOTES
Danica Badillo  : 1985  Primary: Vaccn [employer] (Other)  Secondary:  [facility] [place] [facility] [place]  Rayna AGRAWAL  [facility] [address]  Phone: [phone]  Fax: [phone] Plan Frequency: 1-2x/wk  Plan of Care/Certification Expiration Date: 04/15/24        Plan of Care/Certification Expiration Date:  Plan of Care/Certification Expiration Date: 04/15/24    Frequency/Duration: Plan Frequency: 1-2x/wk      Time In/Out:   Time In: 1430  Time Out: 1535      PT Visit Info:    Plan Frequency: 1-2x/wk      Visit Count:  4    OUTPATIENT PHYSICAL THERAPY:   Treatment Note 2024       Episode  (s/p L shoulder scope)               Treatment Diagnosis:    Left shoulder pain, unspecified chronicity  Shoulder weakness  S/P left rotator cuff repair  Medical/Referring Diagnosis:    Nontraumatic incomplete tear of left rotator cuff [M75.112]    Referring Physician:  Jani Abrams MD MD Orders:  PT Eval and Treat   Return MD Appt:  24   Date of Onset:  Onset Date: 24 (DOS)     Allergies:   Patient has no known allergies.  Restrictions/Precautions:   Small rotator cuff repair and biceps tenodesis protocol      Interventions Planned (Treatment may consist of any combination of the following):  Current Treatment Recommendations: Strengthening; ROM; Manual; Safety education & training; Dry needling; Modalities; Home exercise program; Return to work related activity; Pain management    See Assessment Note    Subjective Comments:   Reports that she went to the gym earlier today and rode the bike and did her exercises  Initial Pain Level::     5/10  Post Session Pain Level:       3 (after cryotherapy)/10  Medications Last Reviewed:  2024  Updated Objective Findings:   ER-45  forward elevation (scapular plane) 110 (passive)  Treatment   THERAPEUTIC EXERCISE: (25 minutes):    Exercises per grid below to improve mobility and strength.     Date  24 Date  24

## 2024-02-07 NOTE — PROGRESS NOTES
Name: Danica Badillo  YOB: 1985  Gender: female  MRN: [id number]    Procedure Performed: 1) left  Shoulder Arthroscopic Rotator Cuff Repair with Regeneten patch  2) left shoulder open subpectoral biceps tenodesis  3) left  Shoulder Arthroscopy with subacromial decompression  4) left shoulder arthroscopy with debridement including labrum and biceps tendon    Date of Procedure: 1/12/24    Subjective:Returns 4 weeks status post the above procedure. She is here today for a wound check.     Physical Examination:Incisions clean dry and intact, no sign of infection. Biceps tendonesis incision below - fully healed at this time. Motor and sensory intact.         Assessment:   1. Visit for wound check         Plan:   Doing well .   Continue PT per Small rotator cuff repair and biceps tenodesis protocol.  Sling immobilization 4 weeks  Follow up in 2 weeks with Dr. Abrams

## 2024-02-08 ENCOUNTER — OFFICE VISIT (OUTPATIENT)
Dept: ORTHOPEDIC SURGERY | Age: 39
End: 2024-02-08

## 2024-02-08 DIAGNOSIS — Z51.89 VISIT FOR WOUND CHECK: Primary | ICD-10-CM

## 2024-02-08 PROCEDURE — 99024 POSTOP FOLLOW-UP VISIT: CPT | Performed by: PHYSICIAN ASSISTANT

## 2024-02-12 ENCOUNTER — HOSPITAL ENCOUNTER (OUTPATIENT)
Dept: PHYSICAL THERAPY | Age: 39
Setting detail: RECURRING SERIES
Discharge: HOME | End: 2024-02-15
Payer: OTHER GOVERNMENT

## 2024-02-12 PROCEDURE — 97140 MANUAL THERAPY 1/> REGIONS: CPT

## 2024-02-12 PROCEDURE — 97110 THERAPEUTIC EXERCISES: CPT

## 2024-02-12 NOTE — PROGRESS NOTES
Danica Badillo  : 1985  Primary: Vaccn [employer] (Other)  Secondary:  [facility] [place]  Rayna AGRAWAL  [facility] [address]  Phone: [phone]  Fax: [phone] Plan Frequency: 1-2x/wk  Plan of Care/Certification Expiration Date: 04/15/24        Plan of Care/Certification Expiration Date:  Plan of Care/Certification Expiration Date: 04/15/24    Frequency/Duration: Plan Frequency: 1-2x/wk      Time In/Out:   Time In: 1100  Time Out: 1200      PT Visit Info:    Plan Frequency: 1-2x/wk      Visit Count:  5    OUTPATIENT PHYSICAL THERAPY:   Treatment Note 2024       Episode  (s/p L shoulder scope)               Treatment Diagnosis:    Left shoulder pain, unspecified chronicity  Shoulder weakness  S/P left rotator cuff repair  Medical/Referring Diagnosis:    Nontraumatic incomplete tear of left rotator cuff [M75.112]    Referring Physician:  Jani Abrams MD MD Orders:  PT Eval and Treat   Return MD Appt:  24   Date of Onset:  Onset Date: 24 (DOS)     Allergies:   Patient has no known allergies.  Restrictions/Precautions:   Small rotator cuff repair and biceps tenodesis protocol      Interventions Planned (Treatment may consist of any combination of the following):  Current Treatment Recommendations: Strengthening; ROM; Manual; Safety education & training; Dry needling; Modalities; Home exercise program; Return to work related activity; Pain management    See Assessment Note    Subjective Comments:   Reports that she is doing some better.   Initial Pain Level::     4/10  Post Session Pain Level:       4/10  Medications Last Reviewed:  2024  Updated Objective Findings:   ER-60  forward elevation (scapular plane) 130 (passive)  Treatment   THERAPEUTIC EXERCISE: (25 minutes):    Exercises per grid below to improve mobility and strength.     Date  24 Date  24   Activity/Exercise     Education Reviewed HEP Reviewed

## 2024-02-15 ENCOUNTER — HOSPITAL ENCOUNTER (OUTPATIENT)
Dept: PHYSICAL THERAPY | Age: 39
Setting detail: RECURRING SERIES
Discharge: HOME | End: 2024-02-18
Payer: OTHER GOVERNMENT

## 2024-02-15 PROCEDURE — 97140 MANUAL THERAPY 1/> REGIONS: CPT

## 2024-02-15 PROCEDURE — 97110 THERAPEUTIC EXERCISES: CPT

## 2024-02-15 ASSESSMENT — PAIN SCALES - GENERAL
PAINLEVEL_OUTOF10: 4
PAINLEVEL_OUTOF10: 1

## 2024-02-15 NOTE — PROGRESS NOTES
Appointments   Date Time Provider Department Center   2/19/2024  1:00 PM Shay Fine, PT [facility]   2/23/2024  3:20 PM Coty Santoyo PA [facility]

## 2024-02-19 ENCOUNTER — HOSPITAL ENCOUNTER (OUTPATIENT)
Dept: PHYSICAL THERAPY | Age: 39
Setting detail: RECURRING SERIES
Discharge: HOME | End: 2024-02-22
Payer: OTHER GOVERNMENT

## 2024-02-19 PROCEDURE — 97110 THERAPEUTIC EXERCISES: CPT

## 2024-02-19 ASSESSMENT — PAIN SCALES - GENERAL: PAINLEVEL_OUTOF10: 1

## 2024-02-19 NOTE — PROGRESS NOTES
Danica Badillo  : 1985  Primary: Vaccn [employer] (Other)  Secondary:  [facility] [place]  Rayna AGRAWAL  [facility] [address]  Phone: [phone]  Fax: [phone] Plan Frequency: 1-2x/wk  Plan of Care/Certification Expiration Date: 04/15/24        Plan of Care/Certification Expiration Date:  Plan of Care/Certification Expiration Date: 04/15/24    Frequency/Duration: Plan Frequency: 1-2x/wk      Time In/Out:   Time In: 1710  Time Out: 1800      PT Visit Info:    Plan Frequency: 1-2x/wk      Visit Count:  7    OUTPATIENT PHYSICAL THERAPY:   Treatment Note 2024       Episode  (s/p L shoulder scope)               Treatment Diagnosis:    Left shoulder pain, unspecified chronicity  Shoulder weakness  S/P left rotator cuff repair  Medical/Referring Diagnosis:    Nontraumatic incomplete tear of left rotator cuff [M75.112]    Referring Physician:  Jani Abrams MD MD Orders:  PT Eval and Treat   Return MD Appt:  24   Date of Onset:  Onset Date: 24 (DOS)     Allergies:   Patient has no known allergies.  Restrictions/Precautions:   Small rotator cuff repair and biceps tenodesis protocol      Interventions Planned (Treatment may consist of any combination of the following):  Current Treatment Recommendations: Strengthening; ROM; Manual; Safety education & training; Dry needling; Modalities; Home exercise program; Return to work related activity; Pain management    See Assessment Note    Subjective Comments:   Reports that she is doing okay. States her pain throughout the day is better but at night the pain is still about the same.  Initial Pain Level::     1/10  Post Session Pain Level:       1/10  Medications Last Reviewed:  2024  Updated Objective Findings:   ER-60  forward elevation (scapular plane) 130 (passive)  Treatment   THERAPEUTIC EXERCISE: (35 minutes):    Exercises per grid below to improve mobility and strength.

## 2024-02-21 ENCOUNTER — TELEPHONE (OUTPATIENT)
Dept: ORTHOPEDIC SURGERY | Age: 39
End: 2024-02-21

## 2024-02-26 ENCOUNTER — OFFICE VISIT (OUTPATIENT)
Dept: ORTHOPEDIC SURGERY | Age: 39
End: 2024-02-26

## 2024-02-26 DIAGNOSIS — Z09 STATUS POST ORTHOPEDIC SURGERY, FOLLOW-UP EXAM: Primary | ICD-10-CM

## 2024-02-26 PROCEDURE — 99024 POSTOP FOLLOW-UP VISIT: CPT | Performed by: ORTHOPAEDIC SURGERY

## 2024-02-26 RX ORDER — DICLOFENAC SODIUM 75 MG/1
75 TABLET, DELAYED RELEASE ORAL 2 TIMES DAILY
Qty: 60 TABLET | Refills: 0 | Status: SHIPPED | OUTPATIENT
Start: 2024-02-26 | End: 2024-03-27

## 2024-02-26 NOTE — PROGRESS NOTES
Name: Danica Badillo  YOB: 1985  Gender: female  MRN: [id number]    PProcedure Performed: 1) left  Shoulder Arthroscopic Rotator Cuff Repair with Regeneten patch  2) left shoulder open subpectoral biceps tenodesis  3) left  Shoulder Arthroscopy with subacromial decompression  4) left shoulder arthroscopy with debridement including labrum and biceps tendon     Date of Procedure: 1/12/24      Subjective:Returns 6 weeks status post the above procedure.  She is doing much better wound is healed.  She felt a pop around her sternum last week when she was doing rotation exercises and had pain in the midline since that time.      Physical Examination:Incisions clean dry and intact, no sign of infection. Motor and sensory intact.  Passive elevation up to about 150.  Passive external rotation about 20 today.  No obvious biceps deformity tolerates full elbow range of motion.  She does have some tenderness to palpation in the sterno costal joint and some pain with that midline left and right.  No bony tenderness.      Assessment:   1. Status post orthopedic surgery, follow-up exam         Plan:   Doing well.  I think she has some costochondritis.  We discussed NSAIDs for her shoulder stiffness and pain as well as the costochondritis.  If she is not improving from that and physical therapy then I would recommend follow-up with her primary care physician.  Continue to progress her range of motion as tolerated..  Prescribed Voltaren today  Continue PT per small rotator cuff repair and biceps tenodesis protocol.    Follow up in 6 weeks

## 2024-02-27 ENCOUNTER — HOSPITAL ENCOUNTER (OUTPATIENT)
Dept: PHYSICAL THERAPY | Age: 39
Setting detail: RECURRING SERIES
Discharge: HOME | End: 2024-03-01
Payer: OTHER GOVERNMENT

## 2024-02-27 PROCEDURE — 97110 THERAPEUTIC EXERCISES: CPT

## 2024-02-27 PROCEDURE — 97140 MANUAL THERAPY 1/> REGIONS: CPT

## 2024-02-27 ASSESSMENT — PAIN SCALES - GENERAL: PAINLEVEL_OUTOF10: 0

## 2024-02-27 NOTE — PROGRESS NOTES
Provider Department [place]   3/5/2024  9:00 AM Rolando Graves, PT [facility]   3/8/2024  9:00 AM Shay Fine, PT [facility]   3/12/2024 10:00 AM Rolando Graves, PT [facility]   3/14/2024  4:30 PM Shay Fine, PT [facility]   4/9/2024  1:30 PM Jani Abrams MD [facility]

## 2024-02-29 ENCOUNTER — HOSPITAL ENCOUNTER (OUTPATIENT)
Dept: PHYSICAL THERAPY | Age: 39
Setting detail: RECURRING SERIES
End: 2024-02-29
Payer: OTHER GOVERNMENT

## 2024-02-29 PROCEDURE — 97140 MANUAL THERAPY 1/> REGIONS: CPT

## 2024-02-29 PROCEDURE — 97110 THERAPEUTIC EXERCISES: CPT

## 2024-02-29 NOTE — PROGRESS NOTES
Danica Badillo  : 1985  Primary: Vaccn [employer] (Other)  Secondary:  [facility] [place]  Rayna AGRAWAL  [facility] [address]  Phone: [phone]  Fax: [phone] Plan Frequency: 1-2x/wk  Plan of Care/Certification Expiration Date: 04/15/24        Plan of Care/Certification Expiration Date:  Plan of Care/Certification Expiration Date: 04/15/24    Frequency/Duration: Plan Frequency: 1-2x/wk      Time In/Out:   Time In: 1340  Time Out: 1430      PT Visit Info:    Plan Frequency: 1-2x/wk      Visit Count:  9    OUTPATIENT PHYSICAL THERAPY:   Treatment Note 2024       Episode  (s/p L shoulder scope)               Treatment Diagnosis:    Left shoulder pain, unspecified chronicity  Shoulder weakness  S/P left rotator cuff repair  Medical/Referring Diagnosis:    Nontraumatic incomplete tear of left rotator cuff [M75.112]    Referring Physician:  Jani Abrams MD MD Orders:  PT Eval and Treat   Return MD Appt:  24   Date of Onset:  Onset Date: 24 (DOS)     Allergies:   Patient has no known allergies.  Restrictions/Precautions:   Small rotator cuff repair and biceps tenodesis protocol      Interventions Planned (Treatment may consist of any combination of the following):  Current Treatment Recommendations: Strengthening; ROM; Manual; Safety education & training; Dry needling; Modalities; Home exercise program; Return to work related activity; Pain management    See Assessment Note    Subjective Comments:   Reports that her shoulder feels pretty good today upon arrival. Has some soreness but no pain.  Initial Pain Level::     0/10  Post Session Pain Level:       5/10  Medications Last Reviewed:  2024  Updated Objective Findings:   ER-60  forward elevation (scapular plane) 150 (passive)  Treatment   THERAPEUTIC EXERCISE: (20 minutes):    Exercises per grid below to improve mobility and strength.     Date  24 Date  24    3/14/2024  4:30 PM Shay Fine, PT [facility]   4/9/2024  1:30 PM Jani Abrams MD [facility]

## 2024-03-01 ASSESSMENT — PAIN SCALES - GENERAL: PAINLEVEL_OUTOF10: 0

## 2024-03-05 ENCOUNTER — HOSPITAL ENCOUNTER (OUTPATIENT)
Dept: PHYSICAL THERAPY | Age: 39
Setting detail: RECURRING SERIES
Discharge: HOME | End: 2024-03-08
Payer: OTHER GOVERNMENT

## 2024-03-05 PROCEDURE — 97110 THERAPEUTIC EXERCISES: CPT

## 2024-03-05 PROCEDURE — 97140 MANUAL THERAPY 1/> REGIONS: CPT

## 2024-03-05 ASSESSMENT — PAIN SCALES - GENERAL: PAINLEVEL_OUTOF10: 4

## 2024-03-05 NOTE — PROGRESS NOTES
Danica Badillo  : 1985  Primary: Vaccn [employer] (Other)  Secondary:  [facility] [place]  Rayna AGRAWAL  [facility] [address]  Phone: [phone]  Fax: [phone] Plan Frequency: 1-2x/wk  Plan of Care/Certification Expiration Date: 04/15/24        Plan of Care/Certification Expiration Date:  Plan of Care/Certification Expiration Date: 04/15/24    Frequency/Duration: Plan Frequency: 1-2x/wk      Time In/Out:   Time In: 0910  Time Out: 1000      PT Visit Info:    Plan Frequency: 1-2x/wk      Visit Count:  10    OUTPATIENT PHYSICAL THERAPY:   Treatment Note 3/5/2024       Episode  (s/p L shoulder scope)               Treatment Diagnosis:    Left shoulder pain, unspecified chronicity  Shoulder weakness  S/P left rotator cuff repair  Medical/Referring Diagnosis:    Nontraumatic incomplete tear of left rotator cuff [M75.112]    Referring Physician:  Jani Abrams MD MD Orders:  PT Eval and Treat   Return MD Appt:  24   Date of Onset:  Onset Date: 24 (DOS)     Allergies:   Patient has no known allergies.  Restrictions/Precautions:   Small rotator cuff repair and biceps tenodesis protocol      Interventions Planned (Treatment may consist of any combination of the following):  Current Treatment Recommendations: Strengthening; ROM; Manual; Safety education & training; Dry needling; Modalities; Home exercise program; Return to work related activity; Pain management    See Assessment Note    Subjective Comments:   Patient arrives late to session. She arrives without her sling and states her shoulder is sore  Initial Pain Level::     4/10  Post Session Pain Level:       4/10  Medications Last Reviewed:  3/5/2024  Updated Objective Findings:   ER-60  forward elevation (scapular plane) 150 (passive)  Treatment   THERAPEUTIC EXERCISE: (20 minutes):    Exercises per grid below to improve mobility and strength.     Date  24 Date  3-5-24

## 2024-03-08 ENCOUNTER — HOSPITAL ENCOUNTER (OUTPATIENT)
Dept: PHYSICAL THERAPY | Age: 39
Setting detail: RECURRING SERIES
Discharge: HOME | End: 2024-03-11
Payer: OTHER GOVERNMENT

## 2024-03-08 PROCEDURE — 97140 MANUAL THERAPY 1/> REGIONS: CPT

## 2024-03-08 PROCEDURE — 97110 THERAPEUTIC EXERCISES: CPT

## 2024-03-08 ASSESSMENT — PAIN SCALES - GENERAL: PAINLEVEL_OUTOF10: 3

## 2024-03-08 NOTE — PLAN OF CARE
elevation (ongoing)  Patient will improve on the DASH questionnaire by 5-10 points (MET)    Long Term Goals: (20-24 wks)  Patient will report % overall improvement in order to demonstrate improved function with less pain (ongoing)  Patient will report feeling comfortable progressing their plan of care from this point forward (ongoing)   Patient will have 4+ or greater ER strength of her external rotators at 0,90,120 and 160 deg in scapular plane (ongoing)  Patient will be able to lift 10 lbs over her head without increased pain (ongoing)  Patient will report being able to workout without increased pain (ongoing)  Patient will improve on the DASH questionnaire by 20-25 points in order to demonstrate improved function with less pain (ongoing)         Outcome Measure:   Tool Used: Disabilities of the Arm, Shoulder and Hand (DASH) Questionnaire - Quick Version  Score:  Initial: 47/55  Most Recent: 40/55 (Date:3-8-24 )   Interpretation of Score: The DASH is designed to measure the activities of daily living in person's with upper extremity dysfunction or pain.  Each section is scored on a 1-5 scale, 5 representing the greatest disability.  The scores of each section are added together for a total score of 55.      Medical Necessity:   > Skilled intervention continues to be required due to ongoing symptoms.  Reason For Services/Other Comments:  > Patient continues to require skilled intervention due to ongoing symptoms.      Regarding Danica Badillo's therapy, I certify that the treatment plan above will be carried out by a therapist or under their direction.  Thank you for this referral,  Shay Fine PT     Referring Physician Signature: Jani Abrams MD _______________________________ Date _____________        Charge Capture  Appt Desk

## 2024-03-08 NOTE — PROGRESS NOTES
2:30 PM Rolando Graves PT [facility]   3/26/2024  9:00 AM Rolando Graves PT [facility]   4/9/2024  1:30 PM Jani Abrams MD [facility]

## 2024-03-12 ENCOUNTER — HOSPITAL ENCOUNTER (OUTPATIENT)
Dept: PHYSICAL THERAPY | Age: 39
Setting detail: RECURRING SERIES
Discharge: HOME | End: 2024-03-15
Payer: OTHER GOVERNMENT

## 2024-03-12 PROCEDURE — 97140 MANUAL THERAPY 1/> REGIONS: CPT

## 2024-03-12 PROCEDURE — 97110 THERAPEUTIC EXERCISES: CPT

## 2024-03-12 ASSESSMENT — PAIN SCALES - GENERAL: PAINLEVEL_OUTOF10: 3

## 2024-03-12 NOTE — PROGRESS NOTES
3/14/2024  4:30 PM Shay Fine, PT [facility]   3/21/2024  2:30 PM Rolando Graves, PT [facility]   3/26/2024  9:00 AM Rolando Graves, PT [facility]   4/9/2024  1:30 PM Jani Abrams MD [facility] AM[facility]

## 2024-03-14 ENCOUNTER — HOSPITAL ENCOUNTER (OUTPATIENT)
Dept: PHYSICAL THERAPY | Age: 39
Setting detail: RECURRING SERIES
Discharge: HOME | End: 2024-03-17
Payer: OTHER GOVERNMENT

## 2024-03-14 PROCEDURE — 97140 MANUAL THERAPY 1/> REGIONS: CPT

## 2024-03-14 PROCEDURE — 97110 THERAPEUTIC EXERCISES: CPT

## 2024-03-14 NOTE — PROGRESS NOTES
Danica Badillo  : 1985  Primary: Vaccn [employer] (Other)  Secondary:  [facility] [place]  Rayna AGRAWAL  [facility] [address]  Phone: [phone]  Fax: [phone] Plan Frequency: 1-2x/wk  Plan of Care/Certification Expiration Date: 24        Plan of Care/Certification Expiration Date:  Plan of Care/Certification Expiration Date: 24    Frequency/Duration: Plan Frequency: 1-2x/wk      Time In/Out:   Time In: 1629  Time Out: 1729      PT Visit Info:    Plan Frequency: 1-2x/wk      Visit Count:  13    OUTPATIENT PHYSICAL THERAPY:   Treatment Note 3/14/2024       Episode  (s/p L shoulder scope)               Treatment Diagnosis:    Left shoulder pain, unspecified chronicity  Shoulder weakness  S/P left rotator cuff repair  Medical/Referring Diagnosis:    Nontraumatic incomplete tear of left rotator cuff [M75.112]    Referring Physician:  Jani Abrams MD MD Orders:  PT Eval and Treat   Return MD Appt:  24   Date of Onset:  Onset Date: 24 (DOS)     Allergies:   Patient has no known allergies.  Restrictions/Precautions:   Small rotator cuff repair and biceps tenodesis protocol      Interventions Planned (Treatment may consist of any combination of the following):  Current Treatment Recommendations: Strengthening; ROM; Manual; Safety education & training; Dry needling; Modalities; Home exercise program; Return to work related activity; Pain management    See Assessment Note    Subjective Comments:   Patient reports feeling better today and she has been compliant with her HEP.   Initial Pain Level::     310  Post Session Pain Level:       3/10  Medications Last Reviewed:  3/14/2024  Updated Objective Findings:   ER-60  forward elevation (scapular plane) 160 (passive) - 3/14/24  Treatment   THERAPEUTIC EXERCISE: (32 minutes):    Exercises per grid below to improve mobility and strength.     Date  3-12-24 Date  3-14-24 Date  3-8-24

## 2024-03-21 ENCOUNTER — HOSPITAL ENCOUNTER (OUTPATIENT)
Dept: PHYSICAL THERAPY | Age: 39
Setting detail: RECURRING SERIES
Discharge: HOME | End: 2024-03-24
Payer: OTHER GOVERNMENT

## 2024-03-21 PROCEDURE — 97140 MANUAL THERAPY 1/> REGIONS: CPT

## 2024-03-21 PROCEDURE — 97110 THERAPEUTIC EXERCISES: CPT

## 2024-03-21 ASSESSMENT — PAIN SCALES - GENERAL: PAINLEVEL_OUTOF10: 3

## 2024-03-21 NOTE — PROGRESS NOTES
[facility]   4/9/2024  1:30 PM Jani Abrams MD [facility]

## 2024-03-26 ENCOUNTER — HOSPITAL ENCOUNTER (OUTPATIENT)
Dept: PHYSICAL THERAPY | Age: 39
Setting detail: RECURRING SERIES
Discharge: HOME | End: 2024-03-29
Payer: OTHER GOVERNMENT

## 2024-03-26 PROCEDURE — 97110 THERAPEUTIC EXERCISES: CPT

## 2024-03-26 ASSESSMENT — PAIN SCALES - GENERAL: PAINLEVEL_OUTOF10: 5

## 2024-03-26 NOTE — PROGRESS NOTES
Danica Badillo  : 1985  Primary: Vaccn [employer] (Other)  Secondary:  [facility] [place]  Rayna AGRAWAL  [facility] [address]  Phone: [phone]  Fax: [phone] Plan Frequency: 1-2x/wk  Plan of Care/Certification Expiration Date: 24        Plan of Care/Certification Expiration Date:  Plan of Care/Certification Expiration Date: 24    Frequency/Duration: Plan Frequency: 1-2x/wk      Time In/Out:   Time In: 0900  Time Out: 1000      PT Visit Info:    Plan Frequency: 1-2x/wk      Visit Count:  15    OUTPATIENT PHYSICAL THERAPY:   Treatment Note 3/26/2024       Episode  (s/p L shoulder scope)               Treatment Diagnosis:    Left shoulder pain, unspecified chronicity  Shoulder weakness  S/P left rotator cuff repair  Medical/Referring Diagnosis:    Nontraumatic incomplete tear of left rotator cuff [M75.112]    Referring Physician:  Jani Abrams MD MD Orders:  PT Eval and Treat   Return MD Appt:  24   Date of Onset:  Onset Date: 24 (DOS)     Allergies:   Patient has no known allergies.  Restrictions/Precautions:   Small rotator cuff repair and biceps tenodesis protocol      Interventions Planned (Treatment may consist of any combination of the following):  Current Treatment Recommendations: Strengthening; ROM; Manual; Safety education & training; Dry needling; Modalities; Home exercise program; Return to work related activity; Pain management    See Assessment Note    Subjective Comments:   Patient reports feeling sore and states she feels her arm is moving more.   Initial Pain Level::     5/10  Post Session Pain Level:       6/10  Medications Last Reviewed:  3/26/2024  Updated Objective Findings:  See Recertification Note from today- 3/21/24  Treatment   THERAPEUTIC EXERCISE: (55 minutes):    Exercises per grid below to improve mobility and strength.     Date  3-26-24 Date  3-14-24 Date  3-21-24   Activity/Exercise

## 2024-03-26 NOTE — THERAPY RECERTIFICATION
-   Rhomboids - -           Special Tests: n/a    Neurological Screen:   Myotomes: n/a         Dermatomes: n/a         Reflexes: n/a         Neural Tension Tests: ULTT (median): n/a  Functional Mobility:    Still unable to use her arm with most functional tasks  Balance:-  ASSESSMENT    Assessment:    Ms. Grove has come for a total of 15 visits since starting physical therapy on 1-16-24. Since beginning therapy she has made slow but gradual steady progress. Patient is limited in all shoulder planes but mostly IR and abduction. Shoulder end feel is very firm  Danica Badillo will continue to benefit from a home exercise program, therapeutic activities, postural strengthening exercises, manual therapeutic techniques, modalities, and pain modulation interventions as appropriate to address their current condition.  Danica Badillo will continue to benefit from skilled physical therapy (medically necessary) to address above deficits affecting participation in basic ADLs and overall functional tolerance.      _________________________________________________________________________________________________________________________________  Therapy Problem List: (Impacting functional limitations):    Body Structures, Functions, Activity Limitations Requiring Skilled Therapeutic Intervention: Decreased functional mobility ; Decreased ADL status; Decreased ROM; Decreased strength; Increased pain; Decreased tolerance to work activity    Therapy Prognosis:   Therapy Prognosis: Good    Initial Assessment Complexity:   Decision Making: Low Complexity      PLAN   Effective Dates: 1/16/2024 TO Plan of Care/Certification Expiration Date: 06/06/24     Frequency/Duration: Plan Frequency: 1-2x/wk      Interventions Planned (Treatment may consist of any combination of the following):    Current Treatment Recommendations: Strengthening; ROM; Manual; Safety education & training; Dry needling; Modalities; Home

## 2024-04-09 ENCOUNTER — OFFICE VISIT (OUTPATIENT)
Dept: ORTHOPEDIC SURGERY | Age: 39
End: 2024-04-09

## 2024-04-09 DIAGNOSIS — Z09 STATUS POST ORTHOPEDIC SURGERY, FOLLOW-UP EXAM: Primary | ICD-10-CM

## 2024-04-09 DIAGNOSIS — M25.612 STIFFNESS OF SHOULDER JOINT, LEFT: ICD-10-CM

## 2024-04-09 PROCEDURE — 99024 POSTOP FOLLOW-UP VISIT: CPT | Performed by: ORTHOPAEDIC SURGERY

## 2024-04-09 RX ORDER — METHYLPREDNISOLONE 4 MG/1
TABLET ORAL
Qty: 1 KIT | Refills: 0 | Status: SHIPPED | OUTPATIENT
Start: 2024-04-09 | End: 2024-04-15

## 2024-04-09 NOTE — PROGRESS NOTES
Name: Danica Badillo  YOB: 1985  Gender: female  MRN: [id number]      CC: Shoulder Pain (L)       HPI: Danica Badillo is a 38 y.o. female who returns for follow up on her left shoulder continues to struggle with some stiffness but making slow progress.        Physical Examination:  General: no acute distress  Lungs: breathing easily  CV: regular rhythm by pulse  Left Shoulder: Incisions well-healed active elevation she can get to just past 90 active assisted to about 160 external rotation 30-40 she has limited external rotation with arm AB ducted with limited abduction range of motion as well.        Assessment:     ICD-10-CM    1. Status post orthopedic surgery, follow-up exam  Z09       2. Stiffness of shoulder joint, left  M25.612           Plan:   Continues to be stiff I think a lot of this is external rotation and internal rotation.  Some of this may be extra-articular.  I do think she would benefit from a Dynasplint for internal/external rotation.  As well as continue physical therapy she needs to incorporate strengthening at this point.  I will place her on a Medrol Dosepak to would hopefully alleviate some of her acute inflammatory symptoms and stiffness.  I will see her back in about 4 weeks for motion check.            Jani Abrams MD, FAAOS  Orthopaedics and Sports Medicine

## 2024-04-25 ENCOUNTER — HOSPITAL ENCOUNTER (OUTPATIENT)
Dept: PHYSICAL THERAPY | Age: 39
Setting detail: RECURRING SERIES
Discharge: HOME | End: 2024-04-28
Payer: OTHER GOVERNMENT

## 2024-04-25 PROCEDURE — 97140 MANUAL THERAPY 1/> REGIONS: CPT

## 2024-04-25 PROCEDURE — 97110 THERAPEUTIC EXERCISES: CPT

## 2024-04-25 ASSESSMENT — PAIN SCALES - GENERAL: PAINLEVEL_OUTOF10: 4

## 2024-04-25 NOTE — PROGRESS NOTES
Danica Badillo  : 1985  Primary: Vaccn [employer] (Other)  Secondary:  [facility] [place]  Rayna AGRAWAL  [facility] [address]  Phone: [phone]  Fax: [phone] Plan Frequency: 1-2x/wk  Plan of Care/Certification Expiration Date: 24        Plan of Care/Certification Expiration Date:  Plan of Care/Certification Expiration Date: 24    Frequency/Duration: Plan Frequency: 1-2x/wk      Time In/Out:   Time In: 1440  Time Out: 1535      PT Visit Info:    Plan Frequency: 1-2x/wk      Visit Count:  16    OUTPATIENT PHYSICAL THERAPY:   Treatment Note 2024       Episode  (s/p L shoulder scope)               Treatment Diagnosis:    Left shoulder pain, unspecified chronicity  Shoulder weakness  S/P left rotator cuff repair  Medical/Referring Diagnosis:    Nontraumatic incomplete tear of left rotator cuff [M75.112]    Referring Physician:  Jani Abrams MD MD Orders:  PT Eval and Treat   Return MD Appt:  2024  Date of Onset:  Onset Date: 24 (DOS)     Allergies:   Patient has no known allergies.  Restrictions/Precautions:   Small rotator cuff repair and biceps tenodesis protocol      Interventions Planned (Treatment may consist of any combination of the following):  Current Treatment Recommendations: Strengthening; ROM; Manual; Safety education & training; Dry needling; Modalities; Home exercise program; Return to work related activity; Pain management    See Assessment Note    Subjective Comments:   Patient reports her shoulder feeling tight and being eager to get it moving.  Initial Pain Level::     4/10  Post Session Pain Level:       6/10  Medications Last Reviewed:  2024  Updated Objective Findings:   L shoulder ROM:  external rotation at neutral: 40, ER at 90 abduction: 35, flexion 140, abduction 110  Treatment   THERAPEUTIC EXERCISE: (43 minutes):    Exercises per grid below to improve mobility and strength.

## 2024-05-01 ENCOUNTER — HOSPITAL ENCOUNTER (OUTPATIENT)
Dept: PHYSICAL THERAPY | Age: 39
Setting detail: RECURRING SERIES
Discharge: HOME | End: 2024-05-04
Payer: OTHER GOVERNMENT

## 2024-05-01 PROCEDURE — 97140 MANUAL THERAPY 1/> REGIONS: CPT

## 2024-05-01 PROCEDURE — 97110 THERAPEUTIC EXERCISES: CPT

## 2024-05-01 ASSESSMENT — PAIN SCALES - GENERAL: PAINLEVEL_OUTOF10: 3

## 2024-05-01 NOTE — PROGRESS NOTES
Danica Badillo  : 1985  Primary: Vaccn [employer] (Other)  Secondary:  [facility] [place]  Rayna AGRAWAL  [facility] [address]  Phone: [phone]  Fax: [phone] Plan Frequency: 1-2x/wk  Plan of Care/Certification Expiration Date: 24        Plan of Care/Certification Expiration Date:  Plan of Care/Certification Expiration Date: 24    Frequency/Duration: Plan Frequency: 1-2x/wk      Time In/Out:   Time In: 1114  Time Out: 1210      PT Visit Info:    Plan Frequency: 1-2x/wk      Visit Count:  17    OUTPATIENT PHYSICAL THERAPY:   Treatment Note 2024       Episode  (s/p L shoulder scope)               Treatment Diagnosis:    Left shoulder pain, unspecified chronicity  Shoulder weakness  S/P left rotator cuff repair  Medical/Referring Diagnosis:    Nontraumatic incomplete tear of left rotator cuff [M75.112]    Referring Physician:  Jani Abrams MD MD Orders:  PT Eval and Treat   Return MD Appt:  2024  Date of Onset:  Onset Date: 24 (DOS)     Allergies:   Patient has no known allergies.  Restrictions/Precautions:   Small rotator cuff repair and biceps tenodesis protocol      Interventions Planned (Treatment may consist of any combination of the following):  Current Treatment Recommendations: Strengthening; ROM; Manual; Safety education & training; Dry needling; Modalities; Home exercise program; Return to work related activity; Pain management    See Assessment Note    Subjective Comments:   Patient reports her shoulder feels like it is moving more.   Initial Pain Level::     3/10  Post Session Pain Level:       5/10  Medications Last Reviewed:  2024  Updated Objective Findings:   L shoulder ROM:  ROM Date:  2024    RIGHT LEFT (Surgical)    SHOULDER ROM   Flexion -- 150°   Extension --    Abduction -- 120°   ER  -- 45° arm at 45°   IR   -- 50° arm at 45°       Treatment   THERAPEUTIC EXERCISE: (44 minutes):

## 2024-05-06 ENCOUNTER — HOSPITAL ENCOUNTER (OUTPATIENT)
Dept: PHYSICAL THERAPY | Age: 39
Setting detail: RECURRING SERIES
Discharge: HOME | End: 2024-05-09
Payer: OTHER GOVERNMENT

## 2024-05-06 PROCEDURE — 97110 THERAPEUTIC EXERCISES: CPT

## 2024-05-06 ASSESSMENT — PAIN SCALES - GENERAL: PAINLEVEL_OUTOF10: 0

## 2024-05-06 NOTE — PROGRESS NOTES
Danica Badillo  : 1985 [facility] [address], [place] [address]  [phone]   [phone]      OUTPATIENT PHYSICAL THERAPY: PHYSICIAN COMMUNICATION    REFERRING PHYSICIAN: Jani Abrams MD  Return Physician Appointment: 24  MEDICAL/REFERRING DIAGNOSIS:  Nontraumatic incomplete tear of left rotator cuff [M75.112]  ATTENDANCE: Danica Badillo has attended 18 sessions of therapy from 24 to 24.    ASSESSMENT:DATE: 2024    PROGRESS: Danica Badillo shoulder ROM continues to demonstrate gradual improvement, but still presents with significant deficits in multiple planes. She also demonstrates weakness when elevating arm over head in scaption and abduction. Danica also reports endrange pain with all planes of movements. Overall, she has shown improvements to shoulder mobility and strength but the deficits still need improving so she can return to her prior level of function.     ROM Date:  2024     RIGHT LEFT (Surgical)    SHOULDER ROM (PASSIVE)   Flexion 180° 155° (from 150°)   Abduction 180° 138° (from 120°)   ER (arm at 90°) 90° 60°    IR (arm at 90°)   80° 30°       RECOMMENDATIONS: Continue therapy 2 times a week through certification period or until goals are met.    Thank you for this referral, and please do not hesitate to contact me at the number listed above if you have any questions.    Rolando Graves, PT, DPT    
and pain   None today - opted to ice at home    Treatment/Session Summary:    Treatment Assessment:   See Progress note from today   Communication/Consultation:   MD progress note  Equipment provided today:  None  Recommendations/Intent for next treatment session: Next visit will focus on progressing her plan of care.    >Total Treatment Billable Duration:  53 minutes  Time In: 1445  Time Out: 1540    Rolando Graves PT         Charge Capture  [employer] Portal  Appt Desk     Future Appointments   Date Time Provider Department Center   5/7/2024  1:40 PM Jani Abrams MD [facility]   5/9/2024  1:30 PM Rolando Graves, PT [facility]   5/13/2024  1:30 PM Rolando Graves, PT [facility]   5/15/2024  2:30 PM Rolando Graves, PT [facility]   5/20/2024  3:30 PM Marleny Pacheco, PT [facility]   5/22/2024 11:00 AM Rolando Graves, PT [facility]   5/29/2024  2:30 PM Shay Fine, PT [facility]   5/31/2024 11:00 AM Shay Fine, PT [facility]   6/3/2024  1:30 PM Shay Fine, PT [facility]   6/5/2024  2:30 PM Rolando Graves, PT [facility]

## 2024-05-07 ENCOUNTER — EVALUATION (OUTPATIENT)
Age: 39
End: 2024-05-07

## 2024-05-07 ENCOUNTER — OFFICE VISIT (OUTPATIENT)
Dept: ORTHOPEDIC SURGERY | Age: 39
End: 2024-05-07

## 2024-05-07 DIAGNOSIS — M25.612 DECREASED RANGE OF MOTION OF LEFT SHOULDER: Primary | ICD-10-CM

## 2024-05-07 DIAGNOSIS — Z09 STATUS POST ORTHOPEDIC SURGERY, FOLLOW-UP EXAM: ICD-10-CM

## 2024-05-07 DIAGNOSIS — M25.612 STIFFNESS OF SHOULDER JOINT, LEFT: Primary | ICD-10-CM

## 2024-05-07 PROCEDURE — 99024 POSTOP FOLLOW-UP VISIT: CPT | Performed by: ORTHOPAEDIC SURGERY

## 2024-05-07 RX ORDER — METHOCARBAMOL 500 MG/1
500 TABLET, FILM COATED ORAL 3 TIMES DAILY PRN
Qty: 20 TABLET | Refills: 0 | Status: SHIPPED | OUTPATIENT
Start: 2024-05-07 | End: 2024-05-17

## 2024-05-07 NOTE — PROGRESS NOTES
Name: Danica Badillo  YOB: 1985  Gender: female  MRN: [id number]      CC: Shoulder Pain (L)       HPI: Danica Badillo is a 38 y.o. female who returns for follow up on  left shoulder. She states she still is struggling with extension and flexion and rotation in her arm. She stated she never received the Dynasplint due to the [facility] not approving to this point      Physical Examination:  General: no acute distress  Lungs: breathing easily  CV: regular rhythm by pulse  Left Shoulder: Active elevation up past 100 degrees passive and active assist I can get her to about 160.  At her side external rotation is about 45.  Significant protraction and winging with tethering        Assessment:     ICD-10-CM    1. Stiffness of shoulder joint, left  M25.612       2. Status post orthopedic surgery, follow-up exam  Z09           Plan:   The a lot of her tethering is periscapular with the subscap and teres and other parascapular muscles.  I examined her today with one of her physical therapist in the office we were able to release some of this and improve range of motion somewhat.  I do think she would benefit from potential dry needling and manual therapy we also will try a muscle relaxer.  I will see back in 6 weeks            Jani Abrams MD, FAAOS  Orthopaedics and Sports Medicine

## 2024-05-07 NOTE — PROGRESS NOTES
[facility]  [address] [place] [id number]  Dept: [phone]     Physical Therapy Consult     Referring MD: Jani Abrams MD    Diagnosis:     ICD-10-CM    1. Decreased range of motion of left shoulder  M25.612          Surgery: left shoulder arthroscopy with subacromial decompression and  rotator cuff repair with allograft augmentation - Left and Left Shoulder Open Biceps Tenodesis - Left   DOS:  1/12/2024     PERTINENT MEDICAL HISTORY     Past medical and surgical history:   Past Medical History:   Diagnosis Date    Hypothyroidism     PTSD (post-traumatic stress disorder)       Past Surgical History:   Procedure Laterality Date    BICEPS TENDON REPAIR Left 1/12/2024    LEFT SHOULDER OPEN BICEPS TENODESIS performed by Jani Abrams MD at [facility]    SHOULDER ARTHROSCOPY Left 1/12/2024    left shoulder arthroscopy with subacromial decompression and  rotator cuff repair with allograft augmentation performed by Jani Abrams MD at [facility]    UMBILICAL HERNIA REPAIR  06/2013     Medications: reviewed in chart   Allergies: No Known Allergies     SUBJECTIVE     Chief complaints/history of injury: Patient is a 38 y.o. female with a PMH as noted above.  She presents to PT for consult at the request of Jani Abrams MD due to continued lag in L shoulder ROM, with associated deficits in strength and continued pain.    Neuro screen: denies numbness, tingling, and radiating pain    OBJECTIVE       Palpation/Observation L Hypertonic teres major, subscapularis, scapular winging and upward rotation   ROM 45 degrees PROM in ER (0 abduction), 90 AROM/115 PROM flexion     Treatment performed:  Manual therapy utilizing techniques to improve joint and/or soft tissue mobility, ROM, and function as well as helping to decrease pain/spasms and swelling.  Palpation and assessment of soft tissue, muscles, and landmarks   STM  Passive release  Teres major  Subscapularis    Patient also instructed in

## 2024-05-09 ENCOUNTER — HOSPITAL ENCOUNTER (OUTPATIENT)
Dept: PHYSICAL THERAPY | Age: 39
Setting detail: RECURRING SERIES
Discharge: HOME | End: 2024-05-12
Payer: OTHER GOVERNMENT

## 2024-05-09 PROCEDURE — 97140 MANUAL THERAPY 1/> REGIONS: CPT

## 2024-05-09 PROCEDURE — 97110 THERAPEUTIC EXERCISES: CPT

## 2024-05-09 NOTE — PROGRESS NOTES
Danica Badillo  : 1985  Primary: Vaccn [employer] (Other)  Secondary:  [facility] [place]  Rayna AGRAWAL  [facility] [address]  Phone: [phone]  Fax: [phone] Plan Frequency: 1-2x/wk  Plan of Care/Certification Expiration Date: 24        Plan of Care/Certification Expiration Date:  Plan of Care/Certification Expiration Date: 24    Frequency/Duration: Plan Frequency: 1-2x/wk      Time In/Out:   Time In: 1332  Time Out: 1430      PT Visit Info:    Plan Frequency: 1-2x/wk      Visit Count:  19    OUTPATIENT PHYSICAL THERAPY:   Treatment Note 2024       Episode  (s/p L shoulder scope)               Treatment Diagnosis:    Left shoulder pain, unspecified chronicity  Shoulder weakness  S/P left rotator cuff repair  Medical/Referring Diagnosis:    Nontraumatic incomplete tear of left rotator cuff [M75.112]    Referring Physician:  Jani Abrams MD MD Orders:  PT Eval and Treat   Return MD Appt:  2024  Date of Onset:  Onset Date: 24 (DOS)     Allergies:   Patient has no known allergies.  Restrictions/Precautions:   Small rotator cuff repair and biceps tenodesis protocol      Interventions Planned (Treatment may consist of any combination of the following):  Current Treatment Recommendations: Strengthening; ROM; Manual; Safety education & training; Dry needling; Modalities; Home exercise program; Return to work related activity; Pain management    See Assessment Note    Subjective Comments:   Patient reports to Pt without pain. She states her follow-up with her surgeon went well.   Initial Pain Level::     0/10  Post Session Pain Level:       3/10  Medications Last Reviewed:  2024  Updated Objective Findings:   L shoulder ROM:  ROM Date:  2024    RIGHT LEFT (Surgical)    SHOULDER ROM   Flexion -- 155° from 150°   Extension --    Abduction -- 140° from 120°   ER  -- 63° from 60° arm at 90°   IR   -- 30° arm at 90°

## 2024-05-10 ASSESSMENT — PAIN SCALES - GENERAL: PAINLEVEL_OUTOF10: 0

## 2024-05-13 ENCOUNTER — HOSPITAL ENCOUNTER (OUTPATIENT)
Dept: PHYSICAL THERAPY | Age: 39
Setting detail: RECURRING SERIES
Discharge: HOME | End: 2024-05-16
Payer: OTHER GOVERNMENT

## 2024-05-13 PROCEDURE — 97110 THERAPEUTIC EXERCISES: CPT

## 2024-05-13 PROCEDURE — 97140 MANUAL THERAPY 1/> REGIONS: CPT

## 2024-05-13 ASSESSMENT — PAIN SCALES - GENERAL: PAINLEVEL_OUTOF10: 0

## 2024-05-13 NOTE — PROGRESS NOTES
Danica Badillo  : 1985  Primary: Vaccn [employer] (Other)  Secondary:  [facility] [place]  Rayna AGRAWAL  [facility] [address]  Phone: [phone]  Fax: [phone] Plan Frequency: 1-2x/wk  Plan of Care/Certification Expiration Date: 24        Plan of Care/Certification Expiration Date:  Plan of Care/Certification Expiration Date: 24    Frequency/Duration: Plan Frequency: 1-2x/wk      Time In/Out:   Time In: 1245  Time Out: 1350      PT Visit Info:    Plan Frequency: 1-2x/wk      Visit Count:  20    OUTPATIENT PHYSICAL THERAPY:   Treatment Note 2024       Episode  (s/p L shoulder scope)               Treatment Diagnosis:    Left shoulder pain, unspecified chronicity  Shoulder weakness  S/P left rotator cuff repair  Medical/Referring Diagnosis:    Nontraumatic incomplete tear of left rotator cuff [M75.112]    Referring Physician:  Jani Abrams MD MD Orders:  PT Eval and Treat   Return MD Appt:  2024  Date of Onset:  Onset Date: 24 (DOS)     Allergies:   Patient has no known allergies.  Restrictions/Precautions:   Small rotator cuff repair and biceps tenodesis protocol      Interventions Planned (Treatment may consist of any combination of the following):  Current Treatment Recommendations: Strengthening; ROM; Manual; Safety education & training; Dry needling; Modalities; Home exercise program; Return to work related activity; Pain management    See Assessment Note    Subjective Comments:   Patient reports that he shoulder feels stuck. No pain at rest.  Initial Pain Level::     0/10  Post Session Pain Level:       5/10  Medications Last Reviewed:  2024  Updated Objective Findings:   L shoulder ROM: (start of session)  ROM Date:  2024    RIGHT LEFT (Surgical)    SHOULDER ROM   Flexion -- 165 deg- supine   Extension --    Abduction --    ER  -- 48 deg (@ 45 deg abd)   IR   -- 35 deg (@ 45 deg abd)       Treatment

## 2024-05-15 ENCOUNTER — HOSPITAL ENCOUNTER (OUTPATIENT)
Dept: PHYSICAL THERAPY | Age: 39
Setting detail: RECURRING SERIES
Discharge: HOME | End: 2024-05-18
Payer: OTHER GOVERNMENT

## 2024-05-15 PROCEDURE — 97110 THERAPEUTIC EXERCISES: CPT

## 2024-05-15 PROCEDURE — 97140 MANUAL THERAPY 1/> REGIONS: CPT

## 2024-05-15 NOTE — PROGRESS NOTES
Danica Badillo  : 1985  Primary: Vaccn [employer] (Other)  Secondary:  [facility] [place]  Rayna AGRAWAL  [facility] [address]  Phone: [phone]  Fax: [phone] Plan Frequency: 1-2x/wk  Plan of Care/Certification Expiration Date: 24        Plan of Care/Certification Expiration Date:  Plan of Care/Certification Expiration Date: 24    Frequency/Duration: Plan Frequency: 1-2x/wk      Time In/Out:   Time In: 1430  Time Out: 1528      PT Visit Info:    Plan Frequency: 1-2x/wk      Visit Count:  21    OUTPATIENT PHYSICAL THERAPY:   Treatment Note 5/15/2024       Episode  (s/p L shoulder scope)               Treatment Diagnosis:    Left shoulder pain, unspecified chronicity  Shoulder weakness  S/P left rotator cuff repair  Medical/Referring Diagnosis:    Nontraumatic incomplete tear of left rotator cuff [M75.112]    Referring Physician:  Jani Abrams MD MD Orders:  PT Eval and Treat   Return MD Appt:  2024  Date of Onset:  Onset Date: 24 (DOS)     Allergies:   Patient has no known allergies.  Restrictions/Precautions:   Small rotator cuff repair and biceps tenodesis protocol      Interventions Planned (Treatment may consist of any combination of the following):  Current Treatment Recommendations: Strengthening; ROM; Manual; Safety education & training; Dry needling; Modalities; Home exercise program; Return to work related activity; Pain management    See Assessment Note    Subjective Comments:   Patient reports that he shoulder feels stuck. No pain at rest.  Initial Pain Level::      /10  Post Session Pain Level:        /10  Medications Last Reviewed:  5/15/2024  Updated Objective Findings:   L shoulder ROM: (start of session)  ROM Date:  5/15/2024    RIGHT LEFT (Surgical)    SHOULDER ROM   Flexion -- 165 deg- supine   Extension --    Abduction --    ER  -- 60 deg (@ 45 deg abd)   IR   -- 35 deg (@ 45 deg abd)       Treatment

## 2024-05-20 ENCOUNTER — HOSPITAL ENCOUNTER (OUTPATIENT)
Dept: PHYSICAL THERAPY | Age: 39
Setting detail: RECURRING SERIES
Discharge: HOME | End: 2024-05-23
Payer: OTHER GOVERNMENT

## 2024-05-20 PROCEDURE — 97110 THERAPEUTIC EXERCISES: CPT

## 2024-05-20 PROCEDURE — 97140 MANUAL THERAPY 1/> REGIONS: CPT

## 2024-05-20 ASSESSMENT — PAIN SCALES - GENERAL: PAINLEVEL_OUTOF10: 0

## 2024-05-20 NOTE — PROGRESS NOTES
Danica Badillo  : 1985  Primary: Vaccn [employer] (Other)  Secondary:  [facility] [place]  Rayna AGRAWAL  [facility] [address]  Phone: [phone]  Fax: [phone] Plan Frequency: 1-2x/wk  Plan of Care/Certification Expiration Date: 24        Plan of Care/Certification Expiration Date:  Plan of Care/Certification Expiration Date: 24    Frequency/Duration: Plan Frequency: 1-2x/wk      Time In/Out:   Time In: 1542  Time Out: 1635      PT Visit Info:    Plan Frequency: 1-2x/wk      Visit Count:  22    OUTPATIENT PHYSICAL THERAPY:   Treatment Note 2024       Episode  (s/p L shoulder scope)               Treatment Diagnosis:    Left shoulder pain, unspecified chronicity  Shoulder weakness  S/P left rotator cuff repair  Medical/Referring Diagnosis:    Nontraumatic incomplete tear of left rotator cuff [M75.112]    Referring Physician:  Jani Abrams MD MD Orders:  PT Eval and Treat   Return MD Appt:  2024  Date of Onset:  Onset Date: 24 (DOS)     Allergies:   Patient has no known allergies.  Restrictions/Precautions:   Small rotator cuff repair and biceps tenodesis protocol      Interventions Planned (Treatment may consist of any combination of the following):  Current Treatment Recommendations: Strengthening; ROM; Manual; Safety education & training; Dry needling; Modalities; Home exercise program; Return to work related activity; Pain management    See Assessment Note    Subjective Comments:   Patient with minimal pain at rest, but feels like her shoulder is stuck.  Initial Pain Level::     0/10  Post Session Pain Level:       4/10  Medications Last Reviewed:  2024  Updated Objective Findings:   L shoulder ROM: (start of session)  ROM Date:  2024    RIGHT LEFT (Surgical)    SHOULDER ROM   Flexion -- 165 deg- supine   Extension --    Abduction --    ER  -- 60 deg (@ 45 deg abd)   IR   -- 40 deg (@ 45 deg abd)

## 2024-05-22 ENCOUNTER — HOSPITAL ENCOUNTER (OUTPATIENT)
Dept: PHYSICAL THERAPY | Age: 39
Setting detail: RECURRING SERIES
Discharge: HOME | End: 2024-05-25
Payer: OTHER GOVERNMENT

## 2024-05-22 PROCEDURE — 97110 THERAPEUTIC EXERCISES: CPT

## 2024-05-22 ASSESSMENT — PAIN SCALES - GENERAL: PAINLEVEL_OUTOF10: 4

## 2024-05-22 NOTE — PROGRESS NOTES
Danica Badillo  : 1985  Primary: Vaccn [employer] (Other)  Secondary:  [facility] [place]  Rayna AGRAWAL  [facility] [address]  Phone: [phone]  Fax: [phone] Plan Frequency: 1-2x/wk  Plan of Care/Certification Expiration Date: 24        Plan of Care/Certification Expiration Date:  Plan of Care/Certification Expiration Date: 24    Frequency/Duration: Plan Frequency: 1-2x/wk      Time In/Out:   Time In: 1105  Time Out: 1200      PT Visit Info:    Plan Frequency: 1-2x/wk      Visit Count:  23                OUTPATIENT PHYSICAL THERAPY:             Progress Report 2024               Episode (s/p L shoulder scope)         Treatment Diagnosis:    Left shoulder pain, unspecified chronicity  Shoulder weakness  S/P left rotator cuff repair  Medical/Referring Diagnosis:    Nontraumatic incomplete tear of left rotator cuff [M75.112]    Referring Physician:  Jani Abrams MD MD Orders:  PT Eval and Treat   Return MD Appt:  24  Date of Onset:  Onset Date: 24 (DOS)     Allergies:  Patient has no known allergies.  Restrictions/Precautions:    Follow small rotator cuff and biceps tenodesis protocol      Medications Last Reviewed:  2024     SUBJECTIVE     ________________________________________________________________________________________________________________________________  History of Injury/Illness (Reason for Referral):  Reports that she had shoulder trouble for a long time. She went to physical therapy for about a year in . She had an mri and decided to have surgery on 24. She has had a hard time sleeping since then. She has been taking oxycodone 5mg for pain. She is right handed.  She enjoys going to the gym and running long distance. She is still active duty in the .   Patient Stated Goal(s):  \"to return to full activity\"  Initial Pain Level:      4/10   Post Session Pain Level:     
improve mobility and strength.     Date  5/15/24 Date  5/20/2024 Date  5/22/24   Activity/Exercise      Education Reviewed HEP Reviewed HEP Reviewed HEP   UBE 5 minutes each: forward and reverse ---   5 minutes each: forward and reverse   Scapular retraction  ---    AROM Supine, forward elevation with ER resistance, yellow band, 10x, 3 sets    Horizontal Abduction w/ light resistance from therapist, 3x10, sidelying ---    ER Side-lying, no resistance, through full available range, 10x, 3 sets, assisted to get to end range and asked to hold  Yellow band, 10x, 3 sets, standing ---   Side-lying, no resistance, through full available range, 10x, 3 sets, assisted to get to end range and asked to hold  Yellow band, 10x, 3 sets, standing   IR Red band, 10x, 3 sets, full motion Sleeper stretch, Internal rotation, 2x10x5\", therapist applied overpressure    Functional internal rotation stretch, adduction only, cane, x 10    Functional reach with elbow against wall, x 10 Sleeper stretch, Internal rotation, 2x10x5\", therapist applied overpressure    Functional internal rotation stretch, adduction only, cane, x 10   TRX --- Flexion, abduction, ER  X 10 each    PROM Shoulder, all directions, 5 minutes Shoulder, all directions, 15 minutes Shoulder, all directions, 15 minutes           Manual Therapy (00 minutes): done to improve soft tissue and joint mobility in order to improve ROM, muscle tone, and decrease pain  ER mobilization with anterior capsule PA glide, grade III-IV  Forward elevation with inferior glide, used belt, grade III-IV  Posterior capsule mobilization, grade IV, followed by active assisted IR  Neutral posterior to anterior mobilization of glenohumeral joint grade IV to improve mobility   Pin and stretch teres minor and subscapularis for IR and ER   Soft tissue mobilization of L periscapular musculature    Modalities: done to decrease swelling and pain   None today - opted to ice at home    Treatment/Session

## 2024-05-29 ENCOUNTER — HOSPITAL ENCOUNTER (OUTPATIENT)
Dept: PHYSICAL THERAPY | Age: 39
Setting detail: RECURRING SERIES
Discharge: HOME | End: 2024-06-01
Payer: OTHER GOVERNMENT

## 2024-05-29 PROCEDURE — 97140 MANUAL THERAPY 1/> REGIONS: CPT

## 2024-05-29 PROCEDURE — 97110 THERAPEUTIC EXERCISES: CPT

## 2024-05-29 ASSESSMENT — PAIN SCALES - GENERAL: PAINLEVEL_OUTOF10: 3

## 2024-05-29 NOTE — PROGRESS NOTES
Ms. Badillo is showing much improved capsular mobility; however, she is still moderately weak. Discussed to push her strength HEP at home. The Dynasplint rep also came today and fitted her for a device and educated her how to use it.  Communication/Consultation:  None today  Equipment provided today:  None  Recommendations/Intent for next treatment session: Next visit will focus on progressing her plan of care.    >Total Treatment Billable Duration:  55 minutes  Time In: 1430  Time Out: 1545    Shay Fine PT         Charge Capture  [employer] Portal  Appt Desk     Future Appointments   Date Time Provider Department Center   5/31/2024 11:00 AM Shay Fine PT [facility]   6/3/2024  1:30 PM Shay Fine PT [facility]   6/5/2024  2:30 PM Rolando Grvaes PT [facility]   6/18/2024  2:40 PM Jani Abrams MD [facility]

## 2024-05-31 ENCOUNTER — HOSPITAL ENCOUNTER (OUTPATIENT)
Dept: PHYSICAL THERAPY | Age: 39
Setting detail: RECURRING SERIES
End: 2024-05-31
Payer: OTHER GOVERNMENT

## 2024-05-31 NOTE — PROGRESS NOTES
Danica Badillo  : 1985  Primary: Vaccn [employer]  Secondary:  [facility] [place] [address] [address]  [address]  [facility] [address]  Phone: [phone]  Fax: [phone]    PT Visit Info:    No data recorded   OT Visit Info:  No data recorded    OUTPATIENT THERAPY: 2024  Episode  Appt Desk        Danica Badillo cancelled her appointment for today due to unknown reasons.  Will plan to follow up next during next appointment.  Thank you,  Shay Fine, PT    Future Appointments   Date Time Provider Department Center   2024  7:00 PM Shay Fine, PT [facility]   6/3/2024  1:30 PM Shay Fine, PT [facility]   2024  2:30 PM Rolando Graves, PT [facility]   2024  2:40 PM Jani Abrams MD [facility]

## 2024-06-03 ENCOUNTER — HOSPITAL ENCOUNTER (OUTPATIENT)
Dept: PHYSICAL THERAPY | Age: 39
Setting detail: RECURRING SERIES
Discharge: HOME | End: 2024-06-06
Payer: OTHER GOVERNMENT

## 2024-06-03 PROCEDURE — 97110 THERAPEUTIC EXERCISES: CPT

## 2024-06-03 PROCEDURE — 97140 MANUAL THERAPY 1/> REGIONS: CPT

## 2024-06-03 ASSESSMENT — PAIN SCALES - GENERAL: PAINLEVEL_OUTOF10: 0

## 2024-06-03 NOTE — PROGRESS NOTES
Danica Badillo  : 1985  Primary: Vaccn [employer] (Other)  Secondary:  [facility] [place]  Rayna AGRAWAL  [facility] [address]  Phone: [phone]  Fax: [phone] Plan Frequency: 1-2x/wk  Plan of Care/Certification Expiration Date: 24        Plan of Care/Certification Expiration Date:  Plan of Care/Certification Expiration Date: 24    Frequency/Duration: Plan Frequency: 1-2x/wk      Time In/Out:   Time In: 1335  Time Out: 1435      PT Visit Info:    Plan Frequency: 1-2x/wk      Visit Count:  25    OUTPATIENT PHYSICAL THERAPY:   Treatment Note 6/3/2024       Episode  (s/p L shoulder scope)               Treatment Diagnosis:    Left shoulder pain, unspecified chronicity  Shoulder weakness  S/P left rotator cuff repair  Medical/Referring Diagnosis:    Nontraumatic incomplete tear of left rotator cuff [M75.112]    Referring Physician:  Jani Abrams MD MD Orders:  PT Eval and Treat   Return MD Appt:  2024  Date of Onset:  Onset Date: 24 (DOS)     Allergies:   Patient has no known allergies.  Restrictions/Precautions:   Small rotator cuff repair and biceps tenodesis protocol      Interventions Planned (Treatment may consist of any combination of the following):  Current Treatment Recommendations: Strengthening; ROM; Manual; Safety education & training; Dry needling; Modalities; Home exercise program; Return to work related activity; Pain management    See Assessment Note    Subjective Comments:   Patient reports that she is doing pretty good but still feels weak.   Initial Pain Level::     0/10  Post Session Pain Level:       0/10  Medications Last Reviewed:  6/3/2024  Updated Objective Findings:   much improved capsular mobility, continued shoulder wkns   ROM Date:  6/3/2024    RIGHT LEFT (Surgical)    SHOULDER ROM   Flexion --    Extension --    Abduction --    ER  --    IR   --        Treatment   THERAPEUTIC EXERCISE: (45

## 2024-06-05 ENCOUNTER — HOSPITAL ENCOUNTER (OUTPATIENT)
Dept: PHYSICAL THERAPY | Age: 39
Setting detail: RECURRING SERIES
Discharge: HOME | End: 2024-06-08
Payer: OTHER GOVERNMENT

## 2024-06-05 PROCEDURE — 97140 MANUAL THERAPY 1/> REGIONS: CPT

## 2024-06-05 PROCEDURE — 97110 THERAPEUTIC EXERCISES: CPT

## 2024-06-05 NOTE — PROGRESS NOTES
Danica Badillo  : 1985  Primary: Vaccn [employer] (Other)  Secondary:  [facility] [place] [facility] [place]  Rayna AGRAWAL  [facility] [address]  Phone: [phone]  Fax: [phone] Plan Frequency: 1-2x/wk  Plan of Care/Certification Expiration Date: 24        Plan of Care/Certification Expiration Date:  Plan of Care/Certification Expiration Date: 24    Frequency/Duration: Plan Frequency: 1-2x/wk      Time In/Out:   Time In: 1432  Time Out: 1530      PT Visit Info:    Plan Frequency: 1-2x/wk      Visit Count:  26    OUTPATIENT PHYSICAL THERAPY:   Treatment Note 2024       Episode  (s/p L shoulder scope)               Treatment Diagnosis:    Left shoulder pain, unspecified chronicity  Shoulder weakness  S/P left rotator cuff repair  Medical/Referring Diagnosis:    Nontraumatic incomplete tear of left rotator cuff [M75.112]    Referring Physician:  Jani Abrams MD MD Orders:  PT Eval and Treat   Return MD Appt:  2024  Date of Onset:  Onset Date: 24 (DOS)     Allergies:   Patient has no known allergies.  Restrictions/Precautions:   Small rotator cuff repair and biceps tenodesis protocol      Interventions Planned (Treatment may consist of any combination of the following):  Current Treatment Recommendations: Strengthening; ROM; Manual; Safety education & training; Dry needling; Modalities; Home exercise program; Return to work related activity; Pain management    See Assessment Note    Subjective Comments:   Patient reports no pain today and states she feels like her shoulder is moving more.   Initial Pain Level::     0/10  Post Session Pain Level:       0/10  Medications Last Reviewed:  2024  Updated Objective Findings:   Shoulder elevation weakness  ROM Date:  2024    RIGHT LEFT (Surgical)    SHOULDER ROM   Flexion --    Extension --    Abduction --    ER  --    IR   --        Treatment   THERAPEUTIC EXERCISE: (45 minutes):

## 2024-06-07 ASSESSMENT — PAIN SCALES - GENERAL: PAINLEVEL_OUTOF10: 0

## 2024-06-10 ENCOUNTER — HOSPITAL ENCOUNTER (OUTPATIENT)
Dept: PHYSICAL THERAPY | Age: 39
Setting detail: RECURRING SERIES
Discharge: HOME | End: 2024-06-13
Payer: OTHER GOVERNMENT

## 2024-06-10 PROCEDURE — 97110 THERAPEUTIC EXERCISES: CPT

## 2024-06-10 PROCEDURE — 97140 MANUAL THERAPY 1/> REGIONS: CPT

## 2024-06-10 ASSESSMENT — PAIN SCALES - GENERAL: PAINLEVEL_OUTOF10: 0

## 2024-06-10 NOTE — PROGRESS NOTES
Dainca Badillo  : 1985  Primary: Vaccn [employer] (Other)  Secondary:  [facility] [place]  Rayna AGRAWAL  [facility] [address]  Phone: [phone]  Fax: [phone] Plan Frequency: 1-2x/wk  Plan of Care/Certification Expiration Date: 24        Plan of Care/Certification Expiration Date:  Plan of Care/Certification Expiration Date: 24    Frequency/Duration: Plan Frequency: 1-2x/wk      Time In/Out:   Time In: 1300  Time Out: 1347      PT Visit Info:    Plan Frequency: 1-2x/wk      Visit Count:  27    OUTPATIENT PHYSICAL THERAPY:   Treatment Note 6/10/2024       Episode  (s/p L shoulder scope)               Treatment Diagnosis:    Left shoulder pain, unspecified chronicity  Shoulder weakness  S/P left rotator cuff repair  Medical/Referring Diagnosis:    Nontraumatic incomplete tear of left rotator cuff [M75.112]    Referring Physician:  Jani Abrams MD MD Orders:  PT Eval and Treat   Return MD Appt:  2024  Date of Onset:  Onset Date: 24 (DOS)     Allergies:   Patient has no known allergies.  Restrictions/Precautions:   Small rotator cuff repair and biceps tenodesis protocol      Interventions Planned (Treatment may consist of any combination of the following):  Current Treatment Recommendations: Strengthening; ROM; Manual; Safety education & training; Dry needling; Modalities; Home exercise program; Return to work related activity; Pain management    See Assessment Note    Subjective Comments:   Patient reports that she is tired today. Feels like she is getting some better.  Initial Pain Level::     0/10  Post Session Pain Level:       0/10  Medications Last Reviewed:  6/10/2024  Updated Objective Findings:   Shoulder elevation weakness  ROM Date:  6/10/2024    RIGHT LEFT (Surgical)    SHOULDER ROM   Flexion --    Extension --    Abduction --    ER  --    IR   --        Treatment   THERAPEUTIC EXERCISE: (35 minutes):

## 2024-06-12 ENCOUNTER — HOSPITAL ENCOUNTER (OUTPATIENT)
Dept: PHYSICAL THERAPY | Age: 39
Setting detail: RECURRING SERIES
Discharge: HOME | End: 2024-06-15
Payer: OTHER GOVERNMENT

## 2024-06-12 PROCEDURE — 97110 THERAPEUTIC EXERCISES: CPT

## 2024-06-12 NOTE — PROGRESS NOTES
Danica Badillo  : 1985  Primary: Vaccn [employer] (Other)  Secondary:  [facility] [place]  Rayna AGRAWAL  [employer] [facility] [employer] [address]  Phone: [phone]  Fax: [phone] Plan Frequency: 1-2x/wk  Plan of Care/Certification Expiration Date: 24        Plan of Care/Certification Expiration Date:  Plan of Care/Certification Expiration Date: 24    Frequency/Duration: Plan Frequency: 1-2x/wk      Time In/Out:   Time In: 1330  Time Out: 1430      PT Visit Info:    Plan Frequency: 1-2x/wk      Visit Count:  28    OUTPATIENT PHYSICAL THERAPY:   Treatment Note 2024       Episode  (s/p L shoulder scope)               Treatment Diagnosis:    Left shoulder pain, unspecified chronicity  Shoulder weakness  S/P left rotator cuff repair  Medical/Referring Diagnosis:    Nontraumatic incomplete tear of left rotator cuff [M75.112]    Referring Physician:  Jani Abrams MD MD Orders:  PT Eval and Treat   Return MD Appt:  2024  Date of Onset:  Onset Date: 24 (DOS)     Allergies:   Patient has no known allergies.  Restrictions/Precautions:   Small rotator cuff repair and biceps tenodesis protocol      Interventions Planned (Treatment may consist of any combination of the following):  Current Treatment Recommendations: Strengthening; ROM; Manual; Safety education & training; Dry needling; Modalities; Home exercise program; Return to work related activity; Pain management    See Assessment Note    Subjective Comments:   Patient arrives to therapy with minimal complaints of pain. Patient states she has been stretching her shoulder and has noticed improvements to her flexibility.  Initial Pain Level::     0/10  Post Session Pain Level:       0/10  Medications Last Reviewed:  2024  Updated Objective Findings:   See Below  ROM Date:  2024    RIGHT LEFT (Surgical)    SHOULDER ROM   Flexion --    Extension --    Abduction --    ER  --    IR   --

## 2024-06-13 ASSESSMENT — PAIN SCALES - GENERAL: PAINLEVEL_OUTOF10: 0

## 2024-06-17 ENCOUNTER — HOSPITAL ENCOUNTER (OUTPATIENT)
Dept: PHYSICAL THERAPY | Age: 39
Setting detail: RECURRING SERIES
Discharge: HOME | End: 2024-06-20
Payer: OTHER GOVERNMENT

## 2024-06-17 PROCEDURE — 97110 THERAPEUTIC EXERCISES: CPT

## 2024-06-17 ASSESSMENT — PAIN SCALES - GENERAL: PAINLEVEL_OUTOF10: 0

## 2024-06-17 NOTE — PROGRESS NOTES
Danica Badillo  : 1985 [facility] at [address], [address], [place] [address]  [phone]   [phone]      OUTPATIENT PHYSICAL THERAPY: PHYSICIAN COMMUNICATION    REFERRING PHYSICIAN: Jani Abrams MD  Return Physician Appointment: 24  MEDICAL/REFERRING DIAGNOSIS:  Nontraumatic incomplete tear of left rotator cuff [M75.112]  ATTENDANCE: Danica Badillo has attended 29 sessions of therapy from 24 to 24.    ASSESSMENT:DATE: 2024    PROGRESS: Danica Badillo still lacking IR and Abduction mostly. All other planes of movement show improvement. Patient still presents with firm and painful end-feels, however.       ROM Date:  2024    RIGHT LEFT (surgical)   SHOULDER ROM   Flexion -- 155° (from 150)   Extension --    Abduction -- 158° (from 142)   ER  -- 70° (no change)   IR   -- 50°                 RECOMMENDATIONS: Continue therapy 2 times a week through certification period or until goals are met.    Thank you for this referral, and please do not hesitate to contact me at the number listed above if you have any questions.    Rolando Graves, PT, DPT

## 2024-06-17 NOTE — PROGRESS NOTES
Danica Badillo  : 1985  Primary: Vaccn [employer] (Other)  Secondary:  [facility] [place] [facility] [place]  Rayna AGRAWAL  [facility] [address]  Phone: [phone]  Fax: [phone] Plan Frequency: 1-2x/wk  Plan of Care/Certification Expiration Date: 24        Plan of Care/Certification Expiration Date:  Plan of Care/Certification Expiration Date: 24    Frequency/Duration: Plan Frequency: 1-2x/wk      Time In/Out:   Time In: 1530  Time Out: 1630      PT Visit Info:    Plan Frequency: 1-2x/wk      Visit Count:  29    OUTPATIENT PHYSICAL THERAPY:   Treatment Note 2024       Episode  (s/p L shoulder scope)               Treatment Diagnosis:    Left shoulder pain, unspecified chronicity  Shoulder weakness  S/P left rotator cuff repair  Medical/Referring Diagnosis:    Nontraumatic incomplete tear of left rotator cuff [M75.112]    Referring Physician:  Jani Abrams MD MD Orders:  PT Eval and Treat   Return MD Appt:  2024  Date of Onset:  Onset Date: 24 (DOS)     Allergies:   Patient has no known allergies.  Restrictions/Precautions:   Small rotator cuff repair and biceps tenodesis protocol      Interventions Planned (Treatment may consist of any combination of the following):  Current Treatment Recommendations: Strengthening; ROM; Manual; Safety education & training; Dry needling; Modalities; Home exercise program; Return to work related activity; Pain management    See Assessment Note    Subjective Comments:   Patient arrives to therapy with no pain and states she feels like her shoulder is moving better.   Initial Pain Level::     0/10  Post Session Pain Level:       0/10  Medications Last Reviewed:  2024  Updated Objective Findings:   See Below    Treatment   THERAPEUTIC EXERCISE: (54 minutes):    Exercises per grid below to improve mobility and strength.     Date  24 Date  24 Date  6/10/24   Activity/Exercise      Education

## 2024-06-18 ENCOUNTER — OFFICE VISIT (OUTPATIENT)
Dept: ORTHOPEDIC SURGERY | Age: 39
End: 2024-06-18
Payer: OTHER GOVERNMENT

## 2024-06-18 DIAGNOSIS — Z09 STATUS POST ORTHOPEDIC SURGERY, FOLLOW-UP EXAM: ICD-10-CM

## 2024-06-18 DIAGNOSIS — M75.52 SUBACROMIAL BURSITIS OF LEFT SHOULDER JOINT: Primary | ICD-10-CM

## 2024-06-18 DIAGNOSIS — M25.612 STIFFNESS OF SHOULDER JOINT, LEFT: ICD-10-CM

## 2024-06-18 PROCEDURE — 99213 OFFICE O/P EST LOW 20 MIN: CPT | Performed by: ORTHOPAEDIC SURGERY

## 2024-06-18 PROCEDURE — 20610 DRAIN/INJ JOINT/BURSA W/O US: CPT | Performed by: ORTHOPAEDIC SURGERY

## 2024-06-18 RX ORDER — TRIAMCINOLONE ACETONIDE 40 MG/ML
40 INJECTION, SUSPENSION INTRA-ARTICULAR; INTRAMUSCULAR ONCE
Status: COMPLETED | OUTPATIENT
Start: 2024-06-18 | End: 2024-06-18

## 2024-06-18 RX ADMIN — TRIAMCINOLONE ACETONIDE 40 MG: 40 INJECTION, SUSPENSION INTRA-ARTICULAR; INTRAMUSCULAR at 15:31

## 2024-06-18 NOTE — PROGRESS NOTES
Name: Danica Badillo  YOB: 1985  Gender: female  MRN: [id number]      CC: Shoulder Pain (L)       HPI: Danica Badillo is a 38 y.o. female who returns for follow up on the left shoulder. She continues to struggle with regaining range of motion.  She makes gains with physical therapy and then feels she looses the motion afterward.         Physical Examination:  General: no acute distress  Lungs: breathing easily  CV: regular rhythm by pulse  Left Shoulder: Incisions are well-healed she does still have some tenderness to palpation around the subscap and the inferior border of the scapula.  A lot of pain with impingement testing and rotation.  Active elevation is to about 160 passively 170-ronal.  Has about an 80 degree arc of rotation with arm AB ducted        Assessment:     ICD-10-CM    1. Subacromial bursitis of left shoulder joint  M75.52       2. Stiffness of shoulder joint, left  M25.612 DRAIN/INJECT LARGE JOINT/BURSA     triamcinolone acetonide (KENALOG-40) injection 40 mg     Ambulatory referral to Physical Therapy      3. Status post orthopedic surgery, follow-up exam  Z09           Plan:   Continues have a lot of pain and stiffness I think a lot of this is subacromial inflammation she may have some glenohumeral adhesions and tightness.  We discussed oral steroids oral anti-inflammatories versus a subacromial or glenohumeral injection versus revision arthroscopy with lysis of adhesions and bursectomy.  She elected proceed with subacromial injection today I recommended she continue physical therapy which was ordered again today.  I will see her back in 6 weeks    The patient elected to proceed with a subacromial injection today.  After verbal informed consent was obtained after sterile prep the Left shoulder subacromial space was injected from a posterior lateral approach with 6 cc of 0.25% Marcaine and 1 cc of 40 mg Kenalog.  The patient tolerated the procedure

## 2024-06-25 NOTE — PERIOP NOTE
Patient verified name and .  Order for consent NOT found in EHR at time of PAT visit. Unable to verify case posting against order; surgery verified by patient.    Type 2 surgery, PAT phone assessment complete.  Orders not received.  Labs per surgeon: unknown; no orders received    Labs per anesthesia protocol: hgb 13.5 24 WDL of anesthesia protocol. Result [facility]    Patient answered medical/surgical history questions at their best of ability. All prior to admission medications documented in EPIC.    Patient instructed to continue taking all prescription medications up to the day of surgery but to take only the following medications the day of surgery according to anesthesia guidelines with a small sip of water: none Also, patient is requested to take 2 Tylenol in the morning and then again before bed on the day before surgery. Regular or extra strength may be used.       Patient informed that all vitamins and supplements should be held 7 days prior to surgery and NSAIDS 5 days prior to surgery. Prescription meds to hold:none    Patient instructed on the following:    > Arrive at [facility]PC Entrance, time of arrival to be called the day before by 1700  > NPO after midnight, unless otherwise indicated, including gum, mints, and ice chips  > Responsible adult must drive patient to the hospital, stay during surgery, and patient will need supervision 24 hours after anesthesia  > Use non moisturizing soap in shower the night before surgery and on the morning of surgery  > All piercings must be removed prior to arrival.    > Leave all valuables (money and jewelry) at home but bring insurance card and ID on DOS.   > You may be required to pay a deductible or co-pay on the day of your procedure. You can pre-pay by calling [phone] if your surgery is at the [facility] or [phone] if your surgery is at the [facility].  > Do not wear make-up, nail polish, lotions, cologne,

## 2024-07-01 ENCOUNTER — ANESTHESIA EVENT (OUTPATIENT)
Dept: SURGERY | Age: 39
End: 2024-07-01
Payer: OTHER GOVERNMENT

## 2024-07-01 NOTE — PERIOP NOTE
Preop department called to notify patient of arrival time for scheduled procedure. Instructions given to   - Arrive at OPC Entrance [address].  - Remain NPO after midnight, unless otherwise indicated, including gum, mints, and ice chips.   - Have a responsible adult to drive patient to the hospital, stay during surgery, and patient will need supervision 24 hours after anesthesia.   - Use antibacterial soap in shower the night before surgery and on the morning of surgery.       Was patient contacted: yes  Voicemail left:   Numbers contacted: [phone]   Arrival time: 0830

## 2024-07-02 ENCOUNTER — HOSPITAL ENCOUNTER (OUTPATIENT)
Age: 39
Setting detail: OUTPATIENT SURGERY
Discharge: HOME | End: 2024-07-02
Attending: SURGERY | Admitting: SURGERY
Payer: OTHER GOVERNMENT

## 2024-07-02 ENCOUNTER — ANESTHESIA (OUTPATIENT)
Dept: SURGERY | Age: 39
End: 2024-07-02
Payer: OTHER GOVERNMENT

## 2024-07-02 VITALS
RESPIRATION RATE: 18 BRPM | WEIGHT: 175 LBS | BODY MASS INDEX: 23.7 KG/M2 | DIASTOLIC BLOOD PRESSURE: 70 MMHG | SYSTOLIC BLOOD PRESSURE: 119 MMHG | HEIGHT: 72 IN | OXYGEN SATURATION: 100 % | HEART RATE: 75 BPM | TEMPERATURE: 97 F

## 2024-07-02 DIAGNOSIS — G89.18 POST-OP PAIN: Primary | ICD-10-CM

## 2024-07-02 LAB — HCG UR QL: NEGATIVE

## 2024-07-02 PROCEDURE — 3600000012 HC SURGERY LEVEL 2 ADDTL 15MIN: Performed by: SURGERY

## 2024-07-02 PROCEDURE — 3700000000 HC ANESTHESIA ATTENDED CARE: Performed by: SURGERY

## 2024-07-02 PROCEDURE — 7100000000 HC PACU RECOVERY - FIRST 15 MIN: Performed by: SURGERY

## 2024-07-02 PROCEDURE — 6370000000 HC RX 637 (ALT 250 FOR IP): Performed by: ANESTHESIOLOGY

## 2024-07-02 PROCEDURE — 81025 URINE PREGNANCY TEST: CPT

## 2024-07-02 PROCEDURE — 2500000003 HC RX 250 WO HCPCS: Performed by: NURSE ANESTHETIST, CERTIFIED REGISTERED

## 2024-07-02 PROCEDURE — 88305 TISSUE EXAM BY PATHOLOGIST: CPT

## 2024-07-02 PROCEDURE — 2580000003 HC RX 258: Performed by: ANESTHESIOLOGY

## 2024-07-02 PROCEDURE — 3700000001 HC ADD 15 MINUTES (ANESTHESIA): Performed by: SURGERY

## 2024-07-02 PROCEDURE — 2500000003 HC RX 250 WO HCPCS: Performed by: SURGERY

## 2024-07-02 PROCEDURE — 3600000002 HC SURGERY LEVEL 2 BASE: Performed by: SURGERY

## 2024-07-02 PROCEDURE — 2709999900 HC NON-CHARGEABLE SUPPLY: Performed by: SURGERY

## 2024-07-02 PROCEDURE — 7100000001 HC PACU RECOVERY - ADDTL 15 MIN: Performed by: SURGERY

## 2024-07-02 PROCEDURE — 6360000002 HC RX W HCPCS: Performed by: SURGERY

## 2024-07-02 PROCEDURE — 2720000010 HC SURG SUPPLY STERILE: Performed by: SURGERY

## 2024-07-02 PROCEDURE — 6360000002 HC RX W HCPCS: Performed by: NURSE ANESTHETIST, CERTIFIED REGISTERED

## 2024-07-02 PROCEDURE — 7100000010 HC PHASE II RECOVERY - FIRST 15 MIN: Performed by: SURGERY

## 2024-07-02 RX ORDER — OXYCODONE HYDROCHLORIDE AND ACETAMINOPHEN 5; 325 MG/1; MG/1
1 TABLET ORAL EVERY 6 HOURS PRN
Qty: 28 TABLET | Refills: 0 | Status: SHIPPED | OUTPATIENT
Start: 2024-07-02 | End: 2024-07-09

## 2024-07-02 RX ORDER — GLYCOPYRROLATE 0.2 MG/ML
INJECTION INTRAMUSCULAR; INTRAVENOUS PRN
Status: DISCONTINUED | OUTPATIENT
Start: 2024-07-02 | End: 2024-07-02 | Stop reason: SDUPTHER

## 2024-07-02 RX ORDER — ROCURONIUM BROMIDE 10 MG/ML
INJECTION, SOLUTION INTRAVENOUS PRN
Status: DISCONTINUED | OUTPATIENT
Start: 2024-07-02 | End: 2024-07-02 | Stop reason: SDUPTHER

## 2024-07-02 RX ORDER — OXYCODONE HYDROCHLORIDE 5 MG/1
5 TABLET ORAL
Status: DISCONTINUED | OUTPATIENT
Start: 2024-07-02 | End: 2024-07-02 | Stop reason: HOSPADM

## 2024-07-02 RX ORDER — SODIUM CHLORIDE 0.9 % (FLUSH) 0.9 %
5-40 SYRINGE (ML) INJECTION EVERY 12 HOURS SCHEDULED
Status: DISCONTINUED | OUTPATIENT
Start: 2024-07-02 | End: 2024-07-02 | Stop reason: HOSPADM

## 2024-07-02 RX ORDER — SODIUM CHLORIDE 9 MG/ML
INJECTION, SOLUTION INTRAVENOUS PRN
Status: DISCONTINUED | OUTPATIENT
Start: 2024-07-02 | End: 2024-07-02 | Stop reason: HOSPADM

## 2024-07-02 RX ORDER — ACETAMINOPHEN 500 MG
1000 TABLET ORAL ONCE
Status: COMPLETED | OUTPATIENT
Start: 2024-07-02 | End: 2024-07-02

## 2024-07-02 RX ORDER — MIDAZOLAM HYDROCHLORIDE 1 MG/ML
INJECTION INTRAMUSCULAR; INTRAVENOUS PRN
Status: DISCONTINUED | OUTPATIENT
Start: 2024-07-02 | End: 2024-07-02 | Stop reason: SDUPTHER

## 2024-07-02 RX ORDER — LIDOCAINE HYDROCHLORIDE 10 MG/ML
INJECTION, SOLUTION INFILTRATION; PERINEURAL PRN
Status: DISCONTINUED | OUTPATIENT
Start: 2024-07-02 | End: 2024-07-02 | Stop reason: ALTCHOICE

## 2024-07-02 RX ORDER — DIPHENHYDRAMINE HYDROCHLORIDE 50 MG/ML
12.5 INJECTION INTRAMUSCULAR; INTRAVENOUS
Status: DISCONTINUED | OUTPATIENT
Start: 2024-07-02 | End: 2024-07-02 | Stop reason: HOSPADM

## 2024-07-02 RX ORDER — PROPOFOL 10 MG/ML
INJECTION, EMULSION INTRAVENOUS PRN
Status: DISCONTINUED | OUTPATIENT
Start: 2024-07-02 | End: 2024-07-02 | Stop reason: SDUPTHER

## 2024-07-02 RX ORDER — HYDROMORPHONE HYDROCHLORIDE 2 MG/ML
INJECTION, SOLUTION INTRAMUSCULAR; INTRAVENOUS; SUBCUTANEOUS PRN
Status: DISCONTINUED | OUTPATIENT
Start: 2024-07-02 | End: 2024-07-02 | Stop reason: SDUPTHER

## 2024-07-02 RX ORDER — SODIUM CHLORIDE, SODIUM LACTATE, POTASSIUM CHLORIDE, CALCIUM CHLORIDE 600; 310; 30; 20 MG/100ML; MG/100ML; MG/100ML; MG/100ML
INJECTION, SOLUTION INTRAVENOUS CONTINUOUS
Status: DISCONTINUED | OUTPATIENT
Start: 2024-07-02 | End: 2024-07-02 | Stop reason: HOSPADM

## 2024-07-02 RX ORDER — EPINEPHRINE 1 MG/ML(1)
AMPUL (ML) INJECTION PRN
Status: DISCONTINUED | OUTPATIENT
Start: 2024-07-02 | End: 2024-07-02 | Stop reason: ALTCHOICE

## 2024-07-02 RX ORDER — LIDOCAINE HYDROCHLORIDE 20 MG/ML
INJECTION, SOLUTION EPIDURAL; INFILTRATION; INTRACAUDAL; PERINEURAL PRN
Status: DISCONTINUED | OUTPATIENT
Start: 2024-07-02 | End: 2024-07-02 | Stop reason: SDUPTHER

## 2024-07-02 RX ORDER — DEXAMETHASONE SODIUM PHOSPHATE 10 MG/ML
INJECTION INTRAMUSCULAR; INTRAVENOUS PRN
Status: DISCONTINUED | OUTPATIENT
Start: 2024-07-02 | End: 2024-07-02 | Stop reason: SDUPTHER

## 2024-07-02 RX ORDER — NEOSTIGMINE METHYLSULFATE 1 MG/ML
INJECTION, SOLUTION INTRAVENOUS PRN
Status: DISCONTINUED | OUTPATIENT
Start: 2024-07-02 | End: 2024-07-02 | Stop reason: SDUPTHER

## 2024-07-02 RX ORDER — NALOXONE HYDROCHLORIDE 0.4 MG/ML
INJECTION, SOLUTION INTRAMUSCULAR; INTRAVENOUS; SUBCUTANEOUS PRN
Status: DISCONTINUED | OUTPATIENT
Start: 2024-07-02 | End: 2024-07-02 | Stop reason: HOSPADM

## 2024-07-02 RX ORDER — HALOPERIDOL 5 MG/ML
1 INJECTION INTRAMUSCULAR
Status: DISCONTINUED | OUTPATIENT
Start: 2024-07-02 | End: 2024-07-02 | Stop reason: HOSPADM

## 2024-07-02 RX ORDER — ONDANSETRON 2 MG/ML
INJECTION INTRAMUSCULAR; INTRAVENOUS PRN
Status: DISCONTINUED | OUTPATIENT
Start: 2024-07-02 | End: 2024-07-02 | Stop reason: SDUPTHER

## 2024-07-02 RX ORDER — LIDOCAINE HYDROCHLORIDE 10 MG/ML
1 INJECTION, SOLUTION INFILTRATION; PERINEURAL
Status: DISCONTINUED | OUTPATIENT
Start: 2024-07-02 | End: 2024-07-02 | Stop reason: HOSPADM

## 2024-07-02 RX ORDER — IBUPROFEN 600 MG/1
1 TABLET ORAL PRN
Status: DISCONTINUED | OUTPATIENT
Start: 2024-07-02 | End: 2024-07-02 | Stop reason: HOSPADM

## 2024-07-02 RX ORDER — SODIUM CHLORIDE 0.9 % (FLUSH) 0.9 %
5-40 SYRINGE (ML) INJECTION PRN
Status: DISCONTINUED | OUTPATIENT
Start: 2024-07-02 | End: 2024-07-02 | Stop reason: HOSPADM

## 2024-07-02 RX ORDER — DEXTROSE MONOHYDRATE 100 MG/ML
INJECTION, SOLUTION INTRAVENOUS CONTINUOUS PRN
Status: DISCONTINUED | OUTPATIENT
Start: 2024-07-02 | End: 2024-07-02 | Stop reason: HOSPADM

## 2024-07-02 RX ORDER — HYDROMORPHONE HYDROCHLORIDE 2 MG/ML
0.5 INJECTION, SOLUTION INTRAMUSCULAR; INTRAVENOUS; SUBCUTANEOUS EVERY 5 MIN PRN
Status: DISCONTINUED | OUTPATIENT
Start: 2024-07-02 | End: 2024-07-02 | Stop reason: HOSPADM

## 2024-07-02 RX ORDER — PROCHLORPERAZINE EDISYLATE 5 MG/ML
5 INJECTION INTRAMUSCULAR; INTRAVENOUS
Status: DISCONTINUED | OUTPATIENT
Start: 2024-07-02 | End: 2024-07-02 | Stop reason: HOSPADM

## 2024-07-02 RX ORDER — MIDAZOLAM HYDROCHLORIDE 2 MG/2ML
2 INJECTION, SOLUTION INTRAMUSCULAR; INTRAVENOUS
Status: DISCONTINUED | OUTPATIENT
Start: 2024-07-02 | End: 2024-07-02 | Stop reason: HOSPADM

## 2024-07-02 RX ORDER — SCOLOPAMINE TRANSDERMAL SYSTEM 1 MG/1
1 PATCH, EXTENDED RELEASE TRANSDERMAL
Status: DISCONTINUED | OUTPATIENT
Start: 2024-07-02 | End: 2024-07-02 | Stop reason: HOSPADM

## 2024-07-02 RX ORDER — IPRATROPIUM BROMIDE AND ALBUTEROL SULFATE 2.5; .5 MG/3ML; MG/3ML
1 SOLUTION RESPIRATORY (INHALATION) ONCE
Status: COMPLETED | OUTPATIENT
Start: 2024-07-02 | End: 2024-07-02

## 2024-07-02 RX ORDER — KETAMINE HCL IN NACL, ISO-OSM 20 MG/2 ML
SYRINGE (ML) INJECTION PRN
Status: DISCONTINUED | OUTPATIENT
Start: 2024-07-02 | End: 2024-07-02 | Stop reason: SDUPTHER

## 2024-07-02 RX ADMIN — SODIUM CHLORIDE, SODIUM LACTATE, POTASSIUM CHLORIDE, AND CALCIUM CHLORIDE: 600; 310; 30; 20 INJECTION, SOLUTION INTRAVENOUS at 12:15

## 2024-07-02 RX ADMIN — Medication 10 MG: at 11:23

## 2024-07-02 RX ADMIN — Medication 10 MG: at 11:02

## 2024-07-02 RX ADMIN — ONDANSETRON 4 MG: 2 INJECTION INTRAMUSCULAR; INTRAVENOUS at 12:03

## 2024-07-02 RX ADMIN — IPRATROPIUM BROMIDE AND ALBUTEROL SULFATE 1 DOSE: 2.5; .5 SOLUTION RESPIRATORY (INHALATION) at 13:15

## 2024-07-02 RX ADMIN — PROPOFOL 150 MG: 10 INJECTION, EMULSION INTRAVENOUS at 10:22

## 2024-07-02 RX ADMIN — SODIUM CHLORIDE, SODIUM LACTATE, POTASSIUM CHLORIDE, AND CALCIUM CHLORIDE: 600; 310; 30; 20 INJECTION, SOLUTION INTRAVENOUS at 09:13

## 2024-07-02 RX ADMIN — Medication 2000 MG: at 10:28

## 2024-07-02 RX ADMIN — ROCURONIUM BROMIDE 50 MG: 50 INJECTION, SOLUTION INTRAVENOUS at 10:22

## 2024-07-02 RX ADMIN — GLYCOPYRROLATE 0.4 MG: 0.2 INJECTION INTRAMUSCULAR; INTRAVENOUS at 12:13

## 2024-07-02 RX ADMIN — MIDAZOLAM 2 MG: 1 INJECTION INTRAMUSCULAR; INTRAVENOUS at 10:21

## 2024-07-02 RX ADMIN — LIDOCAINE HYDROCHLORIDE 60 MG: 20 INJECTION, SOLUTION EPIDURAL; INFILTRATION; INTRACAUDAL; PERINEURAL at 10:22

## 2024-07-02 RX ADMIN — ACETAMINOPHEN 1000 MG: 500 TABLET, FILM COATED ORAL at 09:12

## 2024-07-02 RX ADMIN — Medication 30 MG: at 10:37

## 2024-07-02 RX ADMIN — ONDANSETRON 4 MG: 2 INJECTION INTRAMUSCULAR; INTRAVENOUS at 10:40

## 2024-07-02 RX ADMIN — DEXAMETHASONE SODIUM PHOSPHATE 10 MG: 10 INJECTION INTRAMUSCULAR; INTRAVENOUS at 10:40

## 2024-07-02 RX ADMIN — Medication 10 MG: at 11:43

## 2024-07-02 RX ADMIN — HYDROMORPHONE HYDROCHLORIDE 0.6 MG: 2 INJECTION INTRAMUSCULAR; INTRAVENOUS; SUBCUTANEOUS at 10:21

## 2024-07-02 RX ADMIN — Medication 3 MG: at 12:13

## 2024-07-02 RX ADMIN — HYDROMORPHONE HYDROCHLORIDE 0.2 MG: 2 INJECTION INTRAMUSCULAR; INTRAVENOUS; SUBCUTANEOUS at 11:42

## 2024-07-02 ASSESSMENT — PAIN SCALES - GENERAL: PAINLEVEL_OUTOF10: 4

## 2024-07-02 ASSESSMENT — PAIN - FUNCTIONAL ASSESSMENT: PAIN_FUNCTIONAL_ASSESSMENT: 0-10

## 2024-07-02 NOTE — ANESTHESIA PRE PROCEDURE
currently requiring medication    PTSD (post-traumatic stress disorder)        Past Surgical History:        Procedure Laterality Date    BICEPS TENDON REPAIR Left 1/12/2024    LEFT SHOULDER OPEN BICEPS TENODESIS performed by Jani Abrams MD at [facility] OPC    SHOULDER ARTHROSCOPY Left 1/12/2024    left shoulder arthroscopy with subacromial decompression and  rotator cuff repair with allograft augmentation performed by Jani Abrams MD at [facility] O[facility]    UMBILICAL HERNIA REPAIR  06/2013       Social History:    Social History     Tobacco Use    Smoking status: Former     Types: Cigarettes    Smokeless tobacco: Never   Substance Use Topics    Alcohol use: Never                                Counseling given: Not Answered      Vital Signs (Current):   Vitals:    06/25/24 1614 07/02/24 0845   BP:  125/63   Pulse:  75   Resp:  18   Temp:  98.4 °F (36.9 °C)   TempSrc:  Oral   SpO2:  100%   Weight: 79.4 kg (175 lb) 79.4 kg (175 lb)   Height: 1.88 m (6' 2\")                                               BP Readings from Last 3 Encounters:   07/02/24 125/63   01/12/24 109/63       NPO Status: Time of last liquid consumption: 2200                        Time of last solid consumption: 2000                        Date of last liquid consumption: 07/01/24                        Date of last solid food consumption: 07/01/24    BMI:   Wt Readings from Last 3 Encounters:   07/02/24 79.4 kg (175 lb)   01/12/24 83.9 kg (185 lb)   12/07/23 83.9 kg (185 lb)     Body mass index is 22.47 kg/m².    CBC:   Lab Results   Component Value Date/Time    WBC 2.5 07/30/2021 09:29 PM    RBC 3.82 07/30/2021 09:29 PM    HGB 11.9 07/30/2021 09:29 PM    HCT 34.9 07/30/2021 09:29 PM    MCV 91.4 07/30/2021 09:29 PM    RDW 11.9 07/30/2021 09:29 PM     07/30/2021 09:29 PM       CMP:   Lab Results   Component Value Date/Time     07/30/2021 09:29 PM    K 3.6 07/30/2021 09:29 PM     07/30/2021 09:29 PM    CO2 25 07/30/2021 09:29 PM    BUN 8

## 2024-07-02 NOTE — H&P
CC: macromastia    HPI: 38 you longstanding history of macromastia.    Past Medical History:   Diagnosis Date    Anemia     [facility]5 6/11/24 in Care Everywhere    Hypothyroidism     states not currently requiring medication    PTSD (post-traumatic stress disorder)        Past Surgical History:   Procedure Laterality Date    BICEPS TENDON REPAIR Left 1/12/2024    LEFT SHOULDER OPEN BICEPS TENODESIS performed by Jani Abrams MD at [facility]    SHOULDER ARTHROSCOPY Left 1/12/2024    left shoulder arthroscopy with subacromial decompression and  rotator cuff repair with allograft augmentation performed by Jani Abrams MD at [facility]    UMBILICAL HERNIA REPAIR  06/2013       Vitals:    07/02/24 0845   BP: 125/63   Pulse: 75   Resp: 18   Temp: 98.4 °F (36.9 °C)   SpO2: 100%     A&O x3  RRR  Resp clear  Reduction marked.    A/P  Will proceed with bilateral reduction mammoplasty. Risks, benefits and alternatives discussed.

## 2024-07-02 NOTE — ANESTHESIA POSTPROCEDURE EVALUATION
Department of Anesthesiology  Postprocedure Note    Patient: Danica Badillo  MRN: [id number]  YOB: 1985  Date of evaluation: 7/2/2024    Procedure Summary       Date: 07/02/24 Room / Location: [facility] OP OR 01 / [facility]    Anesthesia Start: 1013 Anesthesia Stop: 1231    Procedure: BILATERAL BREAST REDUCTION (Bilateral: Breast) Diagnosis:       Hypertrophy of breast      (Hypertrophy of breast [N62])    Surgeons: Julius Cunha MD Responsible Provider: Dee Dee Whipple MD    Anesthesia Type: General ASA Status: 2            Anesthesia Type: General    Germaine Phase I: Germaine Score: 10    Germaine Phase II:      Anesthesia Post Evaluation    Patient location during evaluation: PACU  Patient participation: complete - patient participated  Level of consciousness: sleepy but conscious  Airway patency: patent  Nausea: well controlled.  Cardiovascular status: acceptable.  Respiratory status: acceptable  Hydration status: stable  Comments: Patient initially complained of trouble catching her breath when she first woke up in PACU (about 30 minutes postop). O2 sat 100% on 3L NC O2. Breath sounds clear bilaterally. Breathing treatment given per patient request with some relief. Brought  to PACU and patient appeared improved. Likely an anxiety component to sensation of SOB. Vital signs stable throughout PACU stay.  Pain management: adequate        No notable events documented.

## 2024-07-02 NOTE — DISCHARGE INSTRUCTIONS
Breast Reduction Surgery: What to Expect at Home  Your Recovery  Breast reduction surgery removes some of the breast tissue and skin from the breasts. This reshapes and lifts the breasts and reduces their size. It can also make the dark area around the nipple smaller. After surgery, you will probably feel weak. You may feel sore for 2 to 3 weeks. You also may feel pulling or stretching in your breast area. Although you may need pain medicine for a week or two, you can expect to feel better and stronger each day.  For several weeks, you may get tired easily or have less energy than usual. You also may have the feeling that fluid is moving in your breasts. This feeling is normal and will go away over time.  If your doctor closed your incisions with removable stitches, the stitches will be taken out in 7 to 14 days.  Your breasts may feel firmer and look rounder. Breast reduction may change the normal feeling in your breast. But in time, some feeling may return.  Keep in mind that it may take time to get used to your breasts after your surgery. You will have swelling at first. But the breasts will soften and develop better shape over time.  This care sheet gives you a general idea about how long it will take for you to recover. But each person recovers at a different pace. Follow the steps below to get better as quickly as possible.  How can you care for yourself at home?  Activity    Rest when you feel tired. Getting enough sleep will help you recover.     For about 2 to 3 weeks after surgery, or until your doctor says it's okay, avoid lifting anything that would make you strain. This may include heavy grocery bags and milk containers, a heavy briefcase or backpack, cat litter or dog food bags, a vacuum , or a child. Do not lift anything over your head for 2 to 3 weeks.     Ask your doctor when you can drive again.     Ask your doctor when it is okay for you to have sex.     You can take your first shower  after you take pain medicine.     You have loose stitches, or your incision comes open.     You are bleeding from the incision.     You have signs of infection, such as:  Increased pain, swelling, warmth, or redness.  Red streaks leading from the incision.  Pus draining from the incision.  A fever.     You have signs of a blood clot in your leg, such as:  Pain in your calf, back of the knee, thigh, or groin.  Redness and swelling in your leg or groin.   Watch closely for any changes in your health, and be sure to contact your doctor if:    You do not get better as expected.   Where can you learn more?  Go to https://www.[employer].net/patientEd and enter T113 to learn more about \"Breast Reduction Surgery: What to Expect at Home.\"  Current as of: 2023  Content Version: 14.1  © 8520-6677 [employer].   Care instructions adapted under license by [employer]. If you have questions about a medical condition or this instruction, always ask your healthcare professional. [employer] disclaims any warranty or liability for your use of this information.             General Anesthesia: Care Instructions  Overview     General anesthesia is medicine that causes you to become unconscious. The medicine can be inhaled or given through a needle in a vein. It affects the whole body.  It keeps you from feeling pain during a procedure. It's used for procedures such as open heart surgery and gallbladder removal. Sometimes it's used for  sections.  It slows down many of your body's normal functions. For example, you may need help to breathe. An anesthesia professional will watch you very closely. They'll make sure you stay safe and comfortable.  General anesthesia is safe for most people. But some things can increase your risk of problems. These include smoking, obesity, and sleep apnea.  Follow-up care is a key part of your treatment and safety. Be sure to make and go to all appointments,

## 2024-07-05 ENCOUNTER — APPOINTMENT (OUTPATIENT)
Dept: PHYSICAL THERAPY | Age: 39
End: 2024-07-05
Payer: OTHER GOVERNMENT

## 2024-07-09 ENCOUNTER — HOSPITAL ENCOUNTER (OUTPATIENT)
Dept: PHYSICAL THERAPY | Age: 39
Setting detail: RECURRING SERIES
Discharge: HOME | End: 2024-07-12
Payer: OTHER GOVERNMENT

## 2024-07-09 PROCEDURE — 97110 THERAPEUTIC EXERCISES: CPT

## 2024-07-09 PROCEDURE — 97140 MANUAL THERAPY 1/> REGIONS: CPT

## 2024-07-09 ASSESSMENT — PAIN SCALES - GENERAL: PAINLEVEL_OUTOF10: 0

## 2024-07-09 NOTE — THERAPY RECERTIFICATION
Danica Badillo  : 1985  Primary: Vaccn [employer] (Other)  Secondary:  [facility] [place]  Rayna AGRAWAL  [facility] [address]  Phone: [phone]  Fax: [phone] Plan Frequency: 1-2x/wk  Plan of Care/Certification Expiration Date: 10/08/24        Plan of Care/Certification Expiration Date:  Plan of Care/Certification Expiration Date: 10/08/24    Frequency/Duration: Plan Frequency: 1-2x/wk      Time In/Out:   Time In: 1035  Time Out: 1130      PT Visit Info:    Plan Frequency: 1-2x/wk      Visit Count:  30                OUTPATIENT PHYSICAL THERAPY:             Recertification 2024               Episode (s/p L shoulder scope)         Treatment Diagnosis:    Left shoulder pain, unspecified chronicity  Shoulder weakness  S/P left rotator cuff repair  Medical/Referring Diagnosis:    Nontraumatic incomplete tear of left rotator cuff [M75.112]    Referring Physician:  Jani Abrams MD MD Orders:  PT Eval and Treat   Return MD Appt:  24  Date of Onset:  Onset Date: 24 (DOS)     Allergies:  Patient has no known allergies.  Restrictions/Precautions:    Follow small rotator cuff and biceps tenodesis protocol      Medications Last Reviewed:  2024     SUBJECTIVE   Reports that she is feeling better overall but still having issues with behind the back motion as well as general shoulder wkns.  ________________________________________________________________________________________________________________________________  History of Injury/Illness (Reason for Referral):  Reports that she had shoulder trouble for a long time. She went to physical therapy for about a year in . She had an mri and decided to have surgery on 24. She has had a hard time sleeping since then. She has been taking oxycodone 5mg for pain. She is right handed.  She enjoys going to the gym and running long distance. She is still active duty in the .

## 2024-07-09 NOTE — PROGRESS NOTES
Communication/Consultation:  None today  Equipment provided today:  None  Recommendations/Intent for next treatment session: Next visit will focus on progressing her plan of care.    >Total Treatment Billable Duration:  53 minutes  Time In: 1035  Time Out: 1130    Shay Fine PT         Charge Capture  [employer] Portal  Appt Desk     Future Appointments   Date Time Provider Department Center   8/6/2024  1:30 PM Jani Abrams MD [facility]

## 2024-07-12 NOTE — OP NOTE
Operative Note      Patient: Danica Badillo  YOB: 1985  MRN: [id number]    Date of Procedure: 7/2/2024    Pre-Op Diagnosis Codes:     * Hypertrophy of breast [N62]    Post-Op Diagnosis: Same       Procedure(s):  BILATERAL BREAST REDUCTION    Surgeon(s):  Julius Cunha MD    Assistant:   * No surgical staff found *    Anesthesia: General    Estimated Blood Loss (mL): None immediate    Complications: None    Specimens:   ID Type Source Tests Collected by Time Destination   A : Right Breast Tissue Breast SURGICAL PATHOLOGY Julius Cunha MD 7/2/2024 1217    B : Left Breast Tissue Breast SURGICAL PATHOLOGY Julius Cunha MD 7/2/2024 1218        Implants:  * No implants in log *        Detailed Description of Procedure:   Prior to procedure informed consent obtained from patient follow discussion of risks, benefits and alternatives including but not limited to asymmetry, deformity, pain, need for further surgery, infection, bleeding, tissue loss. Discussed loss of NAC function, sensation or tissue. Wise pattern reduction marked in holding with patient in the upright position marking the midline, breast meridian, lateral and medial breast margins and new NAC position at the at the level of the IMF.     Patient was brought to the OR. General anesthesia induced. Surgical timeout performed. Previous marks reinforced. 100 ml of 0.25% Lidocaine with epi infiltrated on the right.     Attention first turned to the right breast. All of the marked incisions were scored and the pedicle de-epithelialized. Incisions then made full thickness. Central mound inferior pedicle developed sharply to the chest wall. Lateral and then medial flap thinned sharply.      Keyhole then sharply excised. Specimen passed off the field. Pedicle then trimmed taking care to maintain lateral chest wall tissue as well as NAC vascularity. Wound irrigated and hemostasis obtained with electrocautery. Temporary closure

## 2024-07-31 ENCOUNTER — APPOINTMENT (OUTPATIENT)
Dept: PHYSICAL THERAPY | Age: 39
End: 2024-07-31
Payer: OTHER GOVERNMENT

## 2024-08-06 ENCOUNTER — OFFICE VISIT (OUTPATIENT)
Dept: ORTHOPEDIC SURGERY | Age: 39
End: 2024-08-06

## 2024-08-06 DIAGNOSIS — Z09 STATUS POST ORTHOPEDIC SURGERY, FOLLOW-UP EXAM: Primary | ICD-10-CM

## 2024-08-06 NOTE — PROGRESS NOTES
Name: Danica Badillo  YOB: 1985  Gender: female  MRN: [id number]      CC: Shoulder Pain (L)       HPI: Danica Badillo is a 38 y.o. female who returns for follow up on left shoulder. She is still having some tenderness at the surgery site but overall has made some good progress since the last visit.        Physical Examination:  General: no acute distress  Lungs: breathing easily  CV: regular rhythm by pulse  Left Shoulder: Symmetric range of motion today good resisted strength but still some soreness with empty can and external rotation testing and some tenderness palpation anteriorly over the biceps but much improved.        Assessment:     ICD-10-CM    1. Status post orthopedic surgery, follow-up exam  Z09           Plan:   She is making good progress now after the injection and continued physical therapy.  I do think it is essential for her recovery and the ability to return to full  activity and fitness testing that she continues physical therapy and we have requested more visits.  She will continue home exercise strengthening program.  We have updated her profile do not think she is ready to carry heavy weights and push or pull heavy weight at this point and likely that will continue until January 1, 2025.  I will see her back in about 2-3 months to monitor progress            Jani Abrams MD, FAAOS  Orthopaedics and Sports Medicine

## 2024-10-23 ENCOUNTER — TELEPHONE (OUTPATIENT)
Dept: ORTHOPEDIC SURGERY | Age: 39
End: 2024-10-23

## 2024-10-23 NOTE — TELEPHONE ENCOUNTER
[facility] On 11/12 as a Urgent Priority    Thanks

## 2024-10-29 ENCOUNTER — CLINICAL DOCUMENTATION (OUTPATIENT)
Dept: ORTHOPEDIC SURGERY | Age: 39
End: 2024-10-29

## 2024-11-12 ENCOUNTER — OFFICE VISIT (OUTPATIENT)
Dept: ORTHOPEDIC SURGERY | Age: 39
End: 2024-11-12
Payer: OTHER GOVERNMENT

## 2024-11-12 DIAGNOSIS — S43.432A SUPERIOR GLENOID LABRUM LESION OF LEFT SHOULDER, INITIAL ENCOUNTER: ICD-10-CM

## 2024-11-12 DIAGNOSIS — Z09 STATUS POST ORTHOPEDIC SURGERY, FOLLOW-UP EXAM: Primary | ICD-10-CM

## 2024-11-12 PROCEDURE — 99213 OFFICE O/P EST LOW 20 MIN: CPT | Performed by: ORTHOPAEDIC SURGERY

## 2024-11-12 NOTE — PROGRESS NOTES
Name: Danica Badillo  YOB: 1985  Gender: female  MRN: [id number]      CC: Shoulder Pain (L)       HPI: Danica Badillo is a 39 y.o. female who returns for follow up on the left shoulder.  She reports improvement with the shoulder, she doesn't feel it is 100% but she has made good progress.  Unfortunately she has been unable to get past 5 to 10 pound weights in the gym to try to build endurance.  She also has some sensitivity around the scar        Physical Examination:  General: no acute distress  Lungs: breathing easily  CV: regular rhythm by pulse  Left Shoulder: Mild tenderness palpation around the biceps incision.  Hypertrophic scar formation.  Full symmetric range of motion but good resisted strength today but still some pain with resisted biceps and speeds testing.  Negative impingement signs.        Assessment:     ICD-10-CM    1. Status post orthopedic surgery, follow-up exam  Z09 Ambulatory referral to Physical Therapy      2. Superior glenoid labrum lesion of left shoulder, initial encounter  S43.432A Ambulatory referral to Physical Therapy          Plan:   She is progressing well just slowly think she needs to build strength and endurance to get back to the high level of activity that she requires.  I do think she would benefit from continued physical therapy.  I would request this be done locally with our [facility] clinic where she is establish continuity of care and they know her case.  We would request 2 times a week for 8 weeks to allow her to improve her strength and hopefully get off restrictions that we have her on for her profile currently.  She is not ready to do fitness testing or lifting and so we will continue the current profile restrictions until February 15 I will see her back prior to that and hopefully lift those restrictions.            Jani Abrams MD, FAAOS  Orthopaedics and Sports Medicine

## 2024-12-10 ENCOUNTER — HOSPITAL ENCOUNTER (OUTPATIENT)
Dept: PHYSICAL THERAPY | Age: 39
Setting detail: RECURRING SERIES
Discharge: HOME | End: 2024-12-13
Attending: ORTHOPAEDIC SURGERY
Payer: OTHER GOVERNMENT

## 2024-12-10 DIAGNOSIS — R29.898 WEAKNESS OF LEFT SHOULDER: ICD-10-CM

## 2024-12-10 DIAGNOSIS — R52 PAIN AGGRAVATED BY LIFTING: ICD-10-CM

## 2024-12-10 DIAGNOSIS — M25.512 LEFT SHOULDER PAIN, UNSPECIFIED CHRONICITY: Primary | ICD-10-CM

## 2024-12-10 PROCEDURE — 97161 PT EVAL LOW COMPLEX 20 MIN: CPT

## 2024-12-10 ASSESSMENT — PAIN SCALES - GENERAL: PAINLEVEL_OUTOF10: 0

## 2024-12-10 NOTE — THERAPY EVALUATION
Justino's therapy, I certify that the treatment plan above will be carried out by a therapist or under their direction.  Thank you for this referral,  Rolando Graves PT     Referring Physician Signature: Jani Abrams MD _______________________________ Date _____________        Charge Capture  Events  Appt Desk  Attendance Report

## 2024-12-10 NOTE — PROGRESS NOTES
Danica Badillo  : 1985  Primary: Vaccn [employer] (Other)  Secondary:  [facility] [place]  Rayna MCCLELLAN A  [facility] [address]  Phone: [phone]  Fax: [phone] Plan Frequency: 1-2 sessions per week for 90 days    Plan of Care/Certification Expiration Date: 03/10/25        Plan of Care/Certification Expiration Date:  Plan of Care/Certification Expiration Date: 03/10/25    Frequency/Duration:   Plan Frequency: 1-2 sessions per week for 90 days      Time In/Out:   Time In: 1553  Time Out: 1635      PT Visit Info:         Visit Count:  1    OUTPATIENT PHYSICAL THERAPY:   Treatment Note 12/10/2024       Episode  (L Shoulder Pain)               Treatment Diagnosis:    Left shoulder pain, unspecified chronicity  Weakness of left shoulder  Pain aggravated by lifting  Medical/Referring Diagnosis:    Status post orthopedic surgery, follow-up exam [Z09]  Superior glenoid labrum lesion of left shoulder, initial encounter [S43.432A]      Referring Physician:  Jani Abrams MD MD Orders:  PT Eval and Treat  Return MD Appt:  24   Date of Onset:  Onset Date: 24 (DOS)     Allergies:   Patient has no known allergies.  Restrictions/Precautions:   None      Interventions Planned (Treatment may consist of any combination of the following):     See Assessment Note    Subjective Comments:   Patient reports L shoulder weakness and pain.   Initial Pain Level::     0/10  Post Session Pain Level:       0/10  Medications Last Reviewed:  12/10/2024  Updated Objective Findings:  See Evaluation Note from today  Treatment   THERAPEUTIC EXERCISE:  (10 minutes):  NOT BILLED  Exercises per grid below to improve mobility and strength.  Required moderate visual, verbal, manual, and tactile cues to promote proper body alignment, promote proper body posture, promote proper body mechanics, and promote proper body breathing techniques.  Progressed resistance, range,

## 2024-12-31 ENCOUNTER — HOSPITAL ENCOUNTER (OUTPATIENT)
Dept: PHYSICAL THERAPY | Age: 39
Setting detail: RECURRING SERIES
Discharge: HOME | End: 2025-01-03
Attending: ORTHOPAEDIC SURGERY
Payer: OTHER GOVERNMENT

## 2024-12-31 PROCEDURE — 97110 THERAPEUTIC EXERCISES: CPT

## 2024-12-31 NOTE — PROGRESS NOTES
Danica Badillo  : 1985  Primary: Vaccn [employer] (Other)  Secondary:  [facility] [place]  Rayna MCCLELLAN A  [facility] [address]  Phone: [phone]  Fax: [phone] Plan Frequency: 1-2 sessions per week for 90 days    Plan of Care/Certification Expiration Date: 03/10/25        Plan of Care/Certification Expiration Date:  Plan of Care/Certification Expiration Date: 03/10/25    Frequency/Duration:   Plan Frequency: 1-2 sessions per week for 90 days      Time In/Out:   Time In: 0904  Time Out: 1000      PT Visit Info:         Visit Count:  2    OUTPATIENT PHYSICAL THERAPY:   Treatment Note 2024       Episode  (L Shoulder Pain)               Treatment Diagnosis:    Left shoulder pain, unspecified chronicity  Weakness of left shoulder  Pain aggravated by lifting  Medical/Referring Diagnosis:    Status post orthopedic surgery, follow-up exam [Z09]  Superior glenoid labrum lesion of left shoulder, initial encounter [S43.432A]      Referring Physician:  Jani Abrams MD MD Orders:  PT Eval and Treat  Return MD Appt:  24   Date of Onset:  Onset Date: 24 (DOS)     Allergies:   Patient has no known allergies.  Restrictions/Precautions:   None      Interventions Planned (Treatment may consist of any combination of the following):     See Assessment Note    Subjective Comments:   Patient reports L shoulder soreness from her exercises.   Initial Pain Level::     0/10  Post Session Pain Level:       0/10  Medications Last Reviewed:  2024  Updated Objective Findings:  None Today  Treatment   THERAPEUTIC EXERCISE:  (54 minutes):    Exercises per grid below to improve mobility and strength.  Required moderate visual, verbal, manual, and tactile cues to promote proper body alignment, promote proper body posture, promote proper body mechanics, and promote proper body breathing techniques.  Progressed resistance, range, repetitions, and

## 2025-01-01 ASSESSMENT — PAIN SCALES - GENERAL: PAINLEVEL_OUTOF10: 0

## 2025-01-02 ENCOUNTER — HOSPITAL ENCOUNTER (OUTPATIENT)
Dept: PHYSICAL THERAPY | Age: 40
Setting detail: RECURRING SERIES
Discharge: HOME | End: 2025-01-05
Attending: ORTHOPAEDIC SURGERY
Payer: OTHER GOVERNMENT

## 2025-01-02 PROCEDURE — 97110 THERAPEUTIC EXERCISES: CPT

## 2025-01-02 ASSESSMENT — PAIN SCALES - GENERAL: PAINLEVEL_OUTOF10: 0

## 2025-01-02 NOTE — PROGRESS NOTES
Danica Badillo  : 1985  Primary: Vaccn [employer] (Other)  Secondary:  [facility] [place]  Rayna MCCLELLAN A  [facility] [address]  Phone: [phone]  Fax: [phone] Plan Frequency: 1-2 sessions per week for 90 days    Plan of Care/Certification Expiration Date: 03/10/25        Plan of Care/Certification Expiration Date:  Plan of Care/Certification Expiration Date: 03/10/25    Frequency/Duration:   Plan Frequency: 1-2 sessions per week for 90 days      Time In/Out:   Time In: 1430  Time Out: 1525      PT Visit Info:         Visit Count:  3    OUTPATIENT PHYSICAL THERAPY:   Treatment Note 2025       Episode  (L Shoulder Pain)               Treatment Diagnosis:    Left shoulder pain, unspecified chronicity  Weakness of left shoulder  Pain aggravated by lifting  Medical/Referring Diagnosis:    Status post orthopedic surgery, follow-up exam [Z09]  Superior glenoid labrum lesion of left shoulder, initial encounter [S43.432A]      Referring Physician:  Jani Abrams MD MD Orders:  PT Eval and Treat  Return MD Appt:  24   Date of Onset:  Onset Date: 24 (DOS)     Allergies:   Patient has no known allergies.  Restrictions/Precautions:   None      Interventions Planned (Treatment may consist of any combination of the following):     See Assessment Note    Subjective Comments:   Patient reports going to the gym and worked out. She has some muscle soreness with a mild pinch in her shoulder.   Initial Pain Level::     0/10  Post Session Pain Level:       0/10  Medications Last Reviewed:  2025  Updated Objective Findings:  None Today  Treatment   THERAPEUTIC EXERCISE:  (53 minutes):    Exercises per grid below to improve mobility and strength.  Required moderate visual, verbal, manual, and tactile cues to promote proper body alignment, promote proper body posture, promote proper body mechanics, and promote proper body breathing techniques.

## 2025-01-06 ENCOUNTER — HOSPITAL ENCOUNTER (OUTPATIENT)
Dept: PHYSICAL THERAPY | Age: 40
Setting detail: RECURRING SERIES
Discharge: HOME | End: 2025-01-09
Attending: ORTHOPAEDIC SURGERY
Payer: OTHER GOVERNMENT

## 2025-01-06 PROCEDURE — 97110 THERAPEUTIC EXERCISES: CPT

## 2025-01-06 ASSESSMENT — PAIN SCALES - GENERAL: PAINLEVEL_OUTOF10: 0

## 2025-01-06 NOTE — PROGRESS NOTES
Danica Badillo  : 1985  Primary: Vaccn [employer] (Other)  Secondary:  [facility] [place]  Rayna MCCLELLAN A  [facility] [address]  Phone: [phone]  Fax: [phone] Plan Frequency: 1-2 sessions per week for 90 days    Plan of Care/Certification Expiration Date: 03/10/25        Plan of Care/Certification Expiration Date:  Plan of Care/Certification Expiration Date: 03/10/25    Frequency/Duration:   Plan Frequency: 1-2 sessions per week for 90 days      Time In/Out:   Time In: 1444  Time Out: 1535      PT Visit Info:         Visit Count:  4    OUTPATIENT PHYSICAL THERAPY:   Treatment Note 2025       Episode  (L Shoulder Pain)               Treatment Diagnosis:    Left shoulder pain, unspecified chronicity  Weakness of left shoulder  Pain aggravated by lifting  Medical/Referring Diagnosis:    Status post orthopedic surgery, follow-up exam [Z09]  Superior glenoid labrum lesion of left shoulder, initial encounter [S43.432A]      Referring Physician:  Jani Abrams MD MD Orders:  PT Eval and Treat  Return MD Appt:  24   Date of Onset:  Onset Date: 24 (DOS)     Allergies:   Patient has no known allergies.  Restrictions/Precautions:   None      Interventions Planned (Treatment may consist of any combination of the following):     See Assessment Note    Subjective Comments:   Patient reports she has been doing her home exercises. She denies pain today.   Initial Pain Level::     0/10  Post Session Pain Level:       0/10  Medications Last Reviewed:  2025  Updated Objective Findings:  None Today  Treatment   THERAPEUTIC EXERCISE:  (45 minutes):    Exercises per grid below to improve mobility and strength.  Required moderate visual, verbal, manual, and tactile cues to promote proper body alignment, promote proper body posture, promote proper body mechanics, and promote proper body breathing techniques.  Progressed resistance, range,

## 2025-01-08 ENCOUNTER — APPOINTMENT (OUTPATIENT)
Dept: PHYSICAL THERAPY | Age: 40
End: 2025-01-08
Attending: ORTHOPAEDIC SURGERY
Payer: OTHER GOVERNMENT

## 2025-01-14 ENCOUNTER — HOSPITAL ENCOUNTER (OUTPATIENT)
Dept: PHYSICAL THERAPY | Age: 40
Setting detail: RECURRING SERIES
Discharge: HOME | End: 2025-01-17
Attending: ORTHOPAEDIC SURGERY
Payer: OTHER GOVERNMENT

## 2025-01-14 PROCEDURE — 97110 THERAPEUTIC EXERCISES: CPT

## 2025-01-14 ASSESSMENT — PAIN SCALES - GENERAL: PAINLEVEL_OUTOF10: 0

## 2025-01-14 NOTE — PROGRESS NOTES
Danica Badillo  : 1985  Primary: Vaccn [employer] (Other)  Secondary:  [facility] [place]  Rayna MCCLELLAN A  [facility] [address]  Phone: [phone]  Fax: [phone] Plan Frequency: 1-2 sessions per week for 90 days    Plan of Care/Certification Expiration Date: 03/10/25        Plan of Care/Certification Expiration Date:  Plan of Care/Certification Expiration Date: 03/10/25    Frequency/Duration:   Plan Frequency: 1-2 sessions per week for 90 days      Time In/Out:   Time In: 1440  Time Out: 1551      PT Visit Info:         Visit Count:  5    OUTPATIENT PHYSICAL THERAPY:   Treatment Note 2025       Episode  (L Shoulder Pain)               Treatment Diagnosis:    Left shoulder pain, unspecified chronicity  Weakness of left shoulder  Pain aggravated by lifting  Medical/Referring Diagnosis:    Status post orthopedic surgery, follow-up exam [Z09]  Superior glenoid labrum lesion of left shoulder, initial encounter [S43.432A]      Referring Physician:  Jani Abrams MD MD Orders:  PT Eval and Treat  Return MD Appt:  24   Date of Onset:  Onset Date: 24 (DOS)     Allergies:   Patient has no known allergies.  Restrictions/Precautions:   None      Interventions Planned (Treatment may consist of any combination of the following):     See Assessment Note    Subjective Comments:   Patient reports her shoulder \"feels good\", she denies any pain.   Initial Pain Level::     0/10  Post Session Pain Level:       0/10  Medications Last Reviewed:  2025  Updated Objective Findings:  None Today  Treatment   THERAPEUTIC EXERCISE:  (50 minutes):    Exercises per grid below to improve mobility and strength.  Required moderate visual, verbal, manual, and tactile cues to promote proper body alignment, promote proper body posture, promote proper body mechanics, and promote proper body breathing techniques.  Progressed resistance, range, repetitions, and

## 2025-01-16 ENCOUNTER — HOSPITAL ENCOUNTER (OUTPATIENT)
Dept: PHYSICAL THERAPY | Age: 40
Setting detail: RECURRING SERIES
Discharge: HOME | End: 2025-01-19
Attending: ORTHOPAEDIC SURGERY
Payer: OTHER GOVERNMENT

## 2025-01-16 PROCEDURE — 97110 THERAPEUTIC EXERCISES: CPT

## 2025-01-16 ASSESSMENT — PAIN SCALES - GENERAL: PAINLEVEL_OUTOF10: 4

## 2025-01-16 NOTE — PROGRESS NOTES
Danica Badillo  : 1985  Primary: Vaccn [employer] (Other)  Secondary:  [facility] [place]  Rayna MCCLELLAN A  [facility] [address]  Phone: [phone]  Fax: [phone] Plan Frequency: 1-2 sessions per week for 90 days    Plan of Care/Certification Expiration Date: 03/10/25        Plan of Care/Certification Expiration Date:  Plan of Care/Certification Expiration Date: 03/10/25    Frequency/Duration:   Plan Frequency: 1-2 sessions per week for 90 days      Time In/Out:   Time In: 1432  Time Out: 1529      PT Visit Info:         Visit Count:  6    OUTPATIENT PHYSICAL THERAPY:   Treatment Note 2025       Episode  (L Shoulder Pain)               Treatment Diagnosis:    Left shoulder pain, unspecified chronicity  Weakness of left shoulder  Pain aggravated by lifting  Medical/Referring Diagnosis:    Status post orthopedic surgery, follow-up exam [Z09]  Superior glenoid labrum lesion of left shoulder, initial encounter [S43.432A]      Referring Physician:  Jani Abrams MD MD Orders:  PT Eval and Treat  Return MD Appt:  24   Date of Onset:  Onset Date: 24 (DOS)     Allergies:   Patient has no known allergies.  Restrictions/Precautions:   None      Interventions Planned (Treatment may consist of any combination of the following):     See Assessment Note    Subjective Comments:   Patient arrives to PT with shoulder soreness from last session at the back of her L shoulder.   Initial Pain Level::     4/10  Post Session Pain Level:       3/10  Medications Last Reviewed:  2025  Updated Objective Findings:   Pain with palpation at the posterior L shoulder  Treatment   THERAPEUTIC EXERCISE:  (45 minutes):    Exercises per grid below to improve mobility and strength.  Required moderate visual, verbal, manual, and tactile cues to promote proper body alignment, promote proper body posture, promote proper body mechanics, and promote proper body

## 2025-01-21 ENCOUNTER — HOSPITAL ENCOUNTER (OUTPATIENT)
Dept: PHYSICAL THERAPY | Age: 40
Setting detail: RECURRING SERIES
Discharge: HOME | End: 2025-01-24
Attending: ORTHOPAEDIC SURGERY
Payer: OTHER GOVERNMENT

## 2025-01-21 PROCEDURE — 97110 THERAPEUTIC EXERCISES: CPT

## 2025-01-21 NOTE — PROGRESS NOTES
Danica Badillo  : 1985  Primary: Vaccn [employer] (Other)  Secondary:  [facility] [place]  Rayna MCCLELLAN A  [facility] [address]  Phone: [phone]  Fax: [phone] Plan Frequency: 1-2 sessions per week for 90 days    Plan of Care/Certification Expiration Date: 03/10/25        Plan of Care/Certification Expiration Date:  Plan of Care/Certification Expiration Date: 03/10/25    Frequency/Duration:   Plan Frequency: 1-2 sessions per week for 90 days      Time In/Out:   Time In: 1432  Time Out: 1530      PT Visit Info:         Visit Count:  7    OUTPATIENT PHYSICAL THERAPY:   Treatment Note 2025       Episode  (L Shoulder Pain)               Treatment Diagnosis:    Left shoulder pain, unspecified chronicity  Weakness of left shoulder  Pain aggravated by lifting  Medical/Referring Diagnosis:    Status post orthopedic surgery, follow-up exam [Z09]  Superior glenoid labrum lesion of left shoulder, initial encounter [S43.432A]      Referring Physician:  Jani Abrams MD MD Orders:  PT Eval and Treat  Return MD Appt:  24   Date of Onset:  Onset Date: 24 (DOS)     Allergies:   Patient has no known allergies.  Restrictions/Precautions:   None      Interventions Planned (Treatment may consist of any combination of the following):     See Assessment Note    Subjective Comments:   Patient reports her shoulder is feeling much better since last session; she has been resting it.   Initial Pain Level::     2/10  Post Session Pain Level:       3/10  Medications Last Reviewed:  2025  Updated Objective Findings:   fatigue with shoulder elevation after 20th repetition  Treatment   THERAPEUTIC EXERCISE:  (53 minutes):    Exercises per grid below to improve mobility and strength.  Required moderate visual, verbal, manual, and tactile cues to promote proper body alignment, promote proper body posture, promote proper body mechanics, and promote proper

## 2025-01-23 ENCOUNTER — HOSPITAL ENCOUNTER (OUTPATIENT)
Dept: PHYSICAL THERAPY | Age: 40
Setting detail: RECURRING SERIES
Discharge: HOME | End: 2025-01-26
Attending: ORTHOPAEDIC SURGERY
Payer: OTHER GOVERNMENT

## 2025-01-23 PROCEDURE — 97110 THERAPEUTIC EXERCISES: CPT

## 2025-01-23 ASSESSMENT — PAIN SCALES - GENERAL
PAINLEVEL_OUTOF10: 2
PAINLEVEL_OUTOF10: 0

## 2025-01-23 NOTE — PROGRESS NOTES
Danica Badillo  : 1985  Primary: Vaccn [employer] (Other)  Secondary:  [facility] [place]  Rayna AGRAWAL  [facility] [address]  Phone: [phone]  Fax: [phone] Plan Frequency: 1-2 sessions per week for 90 days    Plan of Care/Certification Expiration Date: 03/10/25        Plan of Care/Certification Expiration Date:  Plan of Care/Certification Expiration Date: 03/10/25    Frequency/Duration:   Plan Frequency: 1-2 sessions per week for 90 days      Time In/Out:   Time In: 1431  Time Out: 1514      PT Visit Info:         Visit Count:  8    OUTPATIENT PHYSICAL THERAPY:   Treatment Note 2025       Episode  (L Shoulder Pain)               Treatment Diagnosis:    Left shoulder pain, unspecified chronicity  Weakness of left shoulder  Pain aggravated by lifting  Medical/Referring Diagnosis:    Status post orthopedic surgery, follow-up exam [Z09]  Superior glenoid labrum lesion of left shoulder, initial encounter [S43.432A]      Referring Physician:  Jani Abrams MD MD Orders:  PT Eval and Treat  Return MD Appt:  24   Date of Onset:  Onset Date: 24 (DOS)     Allergies:   Patient has no known allergies.  Restrictions/Precautions:   None      Interventions Planned (Treatment may consist of any combination of the following):     See Assessment Note    Subjective Comments:   Patient reports her shoulder is pain free. She says she is tired today from sleeping very little.   Initial Pain Level::     0/10  Post Session Pain Level:       0/10  Medications Last Reviewed:  2025  Updated Objective Findings:  None Today  Treatment   THERAPEUTIC EXERCISE:  (40 minutes):    Exercises per grid below to improve mobility and strength.  Required moderate visual, verbal, manual, and tactile cues to promote proper body alignment, promote proper body posture, promote proper body mechanics, and promote proper body breathing techniques.  Progressed

## 2025-01-27 ENCOUNTER — HOSPITAL ENCOUNTER (OUTPATIENT)
Dept: PHYSICAL THERAPY | Age: 40
Setting detail: RECURRING SERIES
Discharge: HOME | End: 2025-01-30
Attending: ORTHOPAEDIC SURGERY
Payer: OTHER GOVERNMENT

## 2025-01-27 PROCEDURE — 97110 THERAPEUTIC EXERCISES: CPT

## 2025-01-27 ASSESSMENT — PAIN SCALES - GENERAL: PAINLEVEL_OUTOF10: 0

## 2025-01-27 NOTE — PROGRESS NOTES
Danica Badillo  : 1985  Primary: Vaccn [employer] (Other)  Secondary:  [facility] [place]  Rayna MCCLELLAN A  [facility] [address]  Phone: [phone]  Fax: [phone] Plan Frequency: 1-2 sessions per week for 90 days    Plan of Care/Certification Expiration Date: 03/10/25        Plan of Care/Certification Expiration Date:  Plan of Care/Certification Expiration Date: 03/10/25    Frequency/Duration:   Plan Frequency: 1-2 sessions per week for 90 days      Time In/Out:   Time In: 1448  Time Out: 1530      PT Visit Info:         Visit Count:  9    OUTPATIENT PHYSICAL THERAPY:   Treatment Note 2025       Episode  (L Shoulder Pain)               Treatment Diagnosis:    Left shoulder pain, unspecified chronicity  Weakness of left shoulder  Pain aggravated by lifting  Medical/Referring Diagnosis:    Status post orthopedic surgery, follow-up exam [Z09]  Superior glenoid labrum lesion of left shoulder, initial encounter [S43.432A]      Referring Physician:  Jani Abrasm MD MD Orders:  PT Eval and Treat  Return MD Appt:  24   Date of Onset:  Onset Date: 24 (DOS)     Allergies:   Patient has no known allergies.  Restrictions/Precautions:   None      Interventions Planned (Treatment may consist of any combination of the following):     See Assessment Note    Subjective Comments:   Patient arrives to therapy feeling good. She hears occasional cracking of her shoulder.   Initial Pain Level::     0/10  Post Session Pain Level:       0/10  Medications Last Reviewed:  2025  Updated Objective Findings:  None Today  Treatment   THERAPEUTIC EXERCISE:  (40 minutes):    Exercises per grid below to improve mobility and strength.  Required moderate visual, verbal, manual, and tactile cues to promote proper body alignment, promote proper body posture, promote proper body mechanics, and promote proper body breathing techniques.  Progressed resistance,

## 2025-01-29 ENCOUNTER — HOSPITAL ENCOUNTER (OUTPATIENT)
Dept: PHYSICAL THERAPY | Age: 40
Setting detail: RECURRING SERIES
Discharge: HOME | End: 2025-02-01
Attending: ORTHOPAEDIC SURGERY
Payer: OTHER GOVERNMENT

## 2025-01-29 PROCEDURE — 97110 THERAPEUTIC EXERCISES: CPT

## 2025-01-29 PROCEDURE — 97140 MANUAL THERAPY 1/> REGIONS: CPT

## 2025-01-29 NOTE — PROGRESS NOTES
Danica Badillo  : 1985  Primary: Vaccn [employer] (Other)  Secondary:  [facility] [place]  Rayna MCCLELLAN A  [facility] [address]  Phone: [phone]  Fax: [phone] Plan Frequency: 1-2 sessions per week for 90 days    Plan of Care/Certification Expiration Date: 03/10/25        Plan of Care/Certification Expiration Date:  Plan of Care/Certification Expiration Date: 03/10/25    Frequency/Duration: Plan Frequency: 1-2 sessions per week for 90 days      Time In/Out:   Time In: 1434  Time Out: 1532      PT Visit Info:         Visit Count:  10                OUTPATIENT PHYSICAL THERAPY:             Progress Report 2025               Episode (L Shoulder Pain)         Treatment Diagnosis:     Left shoulder pain, unspecified chronicity  Weakness of left shoulder  Pain aggravated by lifting  Medical/Referring Diagnosis:    Status post orthopedic surgery, follow-up exam [Z09]  Superior glenoid labrum lesion of left shoulder, initial encounter [S43.432A]      Referring Physician:  Jani Abrams MD MD Orders:  PT Eval and Treat   Return MD Appt:  24 f/u  Date of Onset:  Onset Date: 24 (DOS)     Allergies:  Patient has no known allergies.  Restrictions/Precautions:    None      Medications Last Reviewed:  2025     SUBJECTIVE   History of Injury/Illness (Reason for Referral):  Danica Badillo is a 39 y.o. female with complaints of L shoulder weakness and pain secondary to shoulder surgery on 24. She came to PT to address immediate deficits following her surgery but she is still having difficulty lifting weights over her head. Danica is actively in the  and is required to perform overhead movements and lift heavy objects. She is also physically active and would like to return to exercising at prior level of function. The patient she will like to do pushups again.     Patient Stated Goal(s):  \"I want to be able

## 2025-01-29 NOTE — PROGRESS NOTES
Danica Badillo  : 1985  Primary: Vaccn [employer] (Other)  Secondary:  [facility] [place]  Rayna MCCLELLAN A  [facility] [address]  Phone: [phone]  Fax: [phone] Plan Frequency: 1-2 sessions per week for 90 days    Plan of Care/Certification Expiration Date: 03/10/25        Plan of Care/Certification Expiration Date:  Plan of Care/Certification Expiration Date: 03/10/25    Frequency/Duration:   Plan Frequency: 1-2 sessions per week for 90 days      Time In/Out:   Time In: 1434  Time Out: 1532      PT Visit Info:         Visit Count:  10    OUTPATIENT PHYSICAL THERAPY:   Treatment Note 2025       Episode  (L Shoulder Pain)               Treatment Diagnosis:    Left shoulder pain, unspecified chronicity  Weakness of left shoulder  Pain aggravated by lifting  Medical/Referring Diagnosis:    Status post orthopedic surgery, follow-up exam [Z09]  Superior glenoid labrum lesion of left shoulder, initial encounter [S43.432A]      Referring Physician:  Jani Abrams MD MD Orders:  PT Eval and Treat  Return MD Appt:  24   Date of Onset:  Onset Date: 24 (DOS)     Allergies:   Patient has no known allergies.  Restrictions/Precautions:   None      Interventions Planned (Treatment may consist of any combination of the following):     See Assessment Note    Subjective Comments:   Patient arrives to therapy feeling good. She states she is mildly sore.  Initial Pain Level::     0/10  Post Session Pain Level:       0/10  Medications Last Reviewed:  2025  Updated Objective Findings:  See Progress Note from today  Treatment   THERAPEUTIC EXERCISE:  (40 minutes):    Exercises per grid below to improve mobility and strength.  Required moderate visual, verbal, manual, and tactile cues to promote proper body alignment, promote proper body posture, promote proper body mechanics, and promote proper body breathing techniques.  Progressed resistance,

## 2025-01-30 ENCOUNTER — OFFICE VISIT (OUTPATIENT)
Dept: ORTHOPEDIC SURGERY | Age: 40
End: 2025-01-30

## 2025-01-30 DIAGNOSIS — Z09 STATUS POST ORTHOPEDIC SURGERY, FOLLOW-UP EXAM: Primary | ICD-10-CM

## 2025-01-30 ASSESSMENT — PAIN SCALES - GENERAL: PAINLEVEL_OUTOF10: 0

## 2025-01-30 NOTE — PROGRESS NOTES
Name: Danica Badillo  YOB: 1985  Gender: female  MRN: [id number]      CC: Shoulder Pain (L)       HPI: Danica Badillo is a 39 y.o. female who returns for follow up on her left shoulder.  She states she is doing well.  She is sore today, but this may be due to physical therapy yesterday.  She is doing better than she was but still has significant weakness.        Physical Examination:  General: no acute distress  Lungs: breathing easily  CV: regular rhythm by pulse  Left Shoulder: Full some tenderness palpation over the pack mild tenderness over the biceps mild parascapular tenderness still poor scapular control.  Appropriate resisted rotator cuff strength minimal discomfort with speeds and [place]'s        Assessment:   No diagnosis found.    Plan:   She is making progress I think she definitely needs more physical therapy to build strength and endurance.  We will continue her current  profile I do not think she is able to perform the majority of the PT test.  Continue physical therapy            Jani Abrams MD, FAAOS  Orthopaedics and Sports Medicine

## 2025-02-11 ENCOUNTER — HOSPITAL ENCOUNTER (OUTPATIENT)
Dept: PHYSICAL THERAPY | Age: 40
Setting detail: RECURRING SERIES
Discharge: HOME | End: 2025-02-14
Attending: ORTHOPAEDIC SURGERY
Payer: OTHER GOVERNMENT

## 2025-02-11 PROCEDURE — 97110 THERAPEUTIC EXERCISES: CPT

## 2025-02-11 ASSESSMENT — PAIN SCALES - GENERAL: PAINLEVEL_OUTOF10: 0

## 2025-02-11 NOTE — PROGRESS NOTES
Danica Badillo  : 1985  Primary: Vaccn [employer] (Other)  Secondary:  [facility] [place]  Rayna MCCLELLAN A  [facility] [address]  Phone: [phone]  Fax: [phone] Plan Frequency: 1-2 sessions per week for 90 days    Plan of Care/Certification Expiration Date: 03/10/25        Plan of Care/Certification Expiration Date:  Plan of Care/Certification Expiration Date: 03/10/25    Frequency/Duration:   Plan Frequency: 1-2 sessions per week for 90 days      Time In/Out:   Time In: 1535  Time Out: 1645      PT Visit Info:    Progress Note Counter: 1      Visit Count:  11    OUTPATIENT PHYSICAL THERAPY:   Treatment Note 2025       Episode  (L Shoulder Pain)               Treatment Diagnosis:    Left shoulder pain, unspecified chronicity  Weakness of left shoulder  Pain aggravated by lifting  Medical/Referring Diagnosis:    Status post orthopedic surgery, follow-up exam [Z09]  Superior glenoid labrum lesion of left shoulder, initial encounter [S43.432A]      Referring Physician:  Jani Abrams MD MD Orders:  PT Eval and Treat  Return MD Appt:  24   Date of Onset:  Onset Date: 24 (DOS)     Allergies:   Patient has no known allergies.  Restrictions/Precautions:   None      Interventions Planned (Treatment may consist of any combination of the following):     See Assessment Note    Subjective Comments:   Patient reports that she is doing pretty good. States she is still unable to do a proper push up.  Initial Pain Level::     0 (at rest)/10  Post Session Pain Level:       0/10  Medications Last Reviewed:  2025  Updated Objective Findings:   mild ER wkns noted at 0 deg abd, anterior hypermobility, tender long head of biceps, IR range was wnl today  Treatment   THERAPEUTIC EXERCISE:  (55 minutes):    Exercises per grid below to improve mobility and strength.  Required moderate visual, verbal, manual, and tactile cues to promote proper body

## 2025-02-18 ENCOUNTER — APPOINTMENT (OUTPATIENT)
Dept: PHYSICAL THERAPY | Age: 40
End: 2025-02-18
Attending: ORTHOPAEDIC SURGERY
Payer: OTHER GOVERNMENT

## (undated) DEVICE — SYSTEM SKIN CLSR 60CM 2-OCTYL CYNOACRLT W/ MESH DISPNS

## (undated) DEVICE — BANDAGE,GAUZE,BULKEE II,4.5"X4.1YD,STRL: Brand: MEDLINE

## (undated) DEVICE — DECANTER FLD 9IN ST BG FOR ASEP TRNSF OF FLD

## (undated) DEVICE — PAD,ABDOMINAL,5"X9",ST,LF,25/BX: Brand: MEDLINE INDUSTRIES, INC.

## (undated) DEVICE — BLADE SHV L13CM DIA4MM BNE CUT AGG DEB COOLCUT

## (undated) DEVICE — PREP-RESISTANT MARKER W/ RULER: Brand: MEDLINE INDUSTRIES, INC.

## (undated) DEVICE — SUTURE ABSRB X-1 REV CUT 1/2 CIR 22MM UD BRAID 27IN SZ 3-0 J458H

## (undated) DEVICE — PAD,NON-ADHERENT,3X8,STERILE,LF,1/PK: Brand: MEDLINE

## (undated) DEVICE — 3M™ MEDIPORE™ H SOFT CLOTH SURGICAL TAPE, 2863, 3 IN X 10 YD, 12/CASE: Brand: 3M™ MEDIPORE™

## (undated) DEVICE — GARMENT,MEDLINE,DVT,INT,CALF,MED, GEN2: Brand: MEDLINE

## (undated) DEVICE — SURG GL,SENSICARE PI ORTHO LT,LF,PF,8.0: Brand: MEDLINE

## (undated) DEVICE — GOWN,PREVENTION PLUS,XLN/XL,ST,24/CS: Brand: MEDLINE

## (undated) DEVICE — WATERPROOF, BACTERIA PROOF DRESSING WITH ABSORBENT SEE THROUGH PAD: Brand: OPSITE POST-OP VISIBLE 15X10CM CTN 20

## (undated) DEVICE — GLOVE SURG SZ 65 L12IN FNGR THK79MIL GRN LTX FREE

## (undated) DEVICE — SUTURE MCRYL SZ 3-0 L27IN ABSRB UD L19MM PS-2 3/8 CIR PRIM Y427H

## (undated) DEVICE — SOLUTION IRRIG 3000ML 0.9% SOD CHL USP UROMATIC PLAS CONT

## (undated) DEVICE — INTENDED FOR TISSUE SEPARATION, AND OTHER PROCEDURES THAT REQUIRE A SHARP SURGICAL BLADE TO PUNCTURE OR CUT.: Brand: BARD-PARKER ® STAINLESS STEEL BLADES

## (undated) DEVICE — SUTURE MCRYL SZ 2-0 L27IN ABSRB UD CP-1 1 L36MM 1/2 CIR REV Y266H

## (undated) DEVICE — GLOVE SURG SZ 65 THK91MIL LTX FREE SYN POLYISOPRENE

## (undated) DEVICE — UNIVERSAL DRAPES: Brand: MEDLINE INDUSTRIES, INC.

## (undated) DEVICE — STRIP,CLOSURE,WOUND,MEDI-STRIP,1/2X4: Brand: MEDLINE

## (undated) DEVICE — APPLICATOR MEDICATED 26 CC SOLUTION HI LT ORNG CHLORAPREP

## (undated) DEVICE — SUTURE STRATAFIX SZ 3-0 L14CM NONABSORBABLE UD L19MM FS-2 SXMP2B406

## (undated) DEVICE — Device

## (undated) DEVICE — 4-PORT MANIFOLD: Brand: NEPTUNE 2

## (undated) DEVICE — 3M™ TEGADERM™ TRANSPARENT FILM DRESSING FRAME STYLE, 1626W, 4 IN X 4-3/4 IN (10 CM X 12 CM), 50/CT 4CT/CASE: Brand: 3M™ TEGADERM™

## (undated) DEVICE — SURGICAL PROCEDURE PACK BASIC ST FRANCIS

## (undated) DEVICE — SPONGE GZ W4XL4IN COT 12 PLY TYP VII WVN C FLD DSGN STERILE

## (undated) DEVICE — ELECTRODE PT RET AD L9FT HI MOIST COND ADH HYDRGEL CORDED

## (undated) DEVICE — SOLUTION IRRIG 1000ML 0.9% SOD CHL USP POUR PLAS BTL

## (undated) DEVICE — GLOVE SURG SZ 85 L12IN FNGR THK79MIL GRN LTX FREE

## (undated) DEVICE — 3M™ STERI-DRAPE™ U-DRAPE 1015: Brand: STERI-DRAPE™

## (undated) DEVICE — GLOVE SURG SZ 7 CRM LTX FREE POLYISOPRENE POLYMER BEAD ANTI

## (undated) DEVICE — GOWN,SIRUS,NONRNF,XLN/XL,20/CS: Brand: MEDLINE

## (undated) DEVICE — TUBING, SUCTION, 1/4" X 10', STRAIGHT: Brand: MEDLINE

## (undated) DEVICE — LIQUIBAND RAPID ADHESIVE 36/CS 0.8ML: Brand: MEDLINE

## (undated) DEVICE — STAPLER SKIN SQ 30 ABSRB STPL DISP INSORB ORDER VIA PHONE OR EMAIL

## (undated) DEVICE — KIT CHAIR TRIMANO FOAM W/ SUPP ARM DRP ERGONOMICALLY DESIGNED

## (undated) DEVICE — MICRODISSECTION NEEDLE STRAIGHT SLEEVE: Brand: COLORADO

## (undated) DEVICE — SHOULDER ARTHRO DR KOCH: Brand: MEDLINE INDUSTRIES, INC.

## (undated) DEVICE — SPONGE LAP W18XL18IN WHT COT 4 PLY FLD STRUNG RADPQ DISP ST 2 PER PACK

## (undated) DEVICE — BUR SHV L13CM DIA4MM 8 FLUT OVL FOR RAP AGG BNE RESECT

## (undated) DEVICE — PENCIL ES L3M BTTN SWCH HOLSTER W/ BLDE ELECTRD EDGE

## (undated) DEVICE — PROBE ABLAT XL 90DEG ASPIR BPLR RF 1 PC ELECTRD ERGO HNDL

## (undated) DEVICE — TUBING PMP L16FT MAIN DISP FOR AR-6400 AR-6475

## (undated) DEVICE — ADHESIVE SKIN CLOSURE WND 8.661X1.5 IN 22 CM LIQUIBAND SECUR

## (undated) DEVICE — BANDAGE COMPR W6INXL10YD ST M E WHITE/BEIGE